# Patient Record
Sex: MALE | Race: BLACK OR AFRICAN AMERICAN | Employment: OTHER | URBAN - METROPOLITAN AREA
[De-identification: names, ages, dates, MRNs, and addresses within clinical notes are randomized per-mention and may not be internally consistent; named-entity substitution may affect disease eponyms.]

---

## 2018-12-31 ENCOUNTER — IP HISTORICAL/CONVERTED ENCOUNTER (OUTPATIENT)
Dept: OTHER | Age: 68
End: 2018-12-31

## 2020-02-28 ENCOUNTER — IP HISTORICAL/CONVERTED ENCOUNTER (OUTPATIENT)
Dept: OTHER | Age: 70
End: 2020-02-28

## 2020-05-22 ENCOUNTER — OP HISTORICAL/CONVERTED ENCOUNTER (OUTPATIENT)
Dept: OTHER | Age: 70
End: 2020-05-22

## 2020-08-28 ENCOUNTER — OP HISTORICAL/CONVERTED ENCOUNTER (OUTPATIENT)
Dept: OTHER | Age: 70
End: 2020-08-28

## 2020-08-29 ENCOUNTER — ED HISTORICAL/CONVERTED ENCOUNTER (OUTPATIENT)
Dept: OTHER | Age: 70
End: 2020-08-29

## 2021-01-06 ENCOUNTER — HOSPITAL ENCOUNTER (INPATIENT)
Age: 71
LOS: 7 days | Discharge: NURSING, REHAB OR LONG-TERM CARE | DRG: 207 | End: 2021-01-14
Attending: EMERGENCY MEDICINE | Admitting: ANESTHESIOLOGY
Payer: MEDICARE

## 2021-01-06 DIAGNOSIS — J95.851 VENTILATOR ASSOCIATED PNEUMONIA (HCC): Primary | ICD-10-CM

## 2021-01-06 LAB
ANION GAP SERPL CALC-SCNC: 4 MMOL/L (ref 5–15)
BUN SERPL-MCNC: 41 MG/DL (ref 6–20)
BUN/CREAT SERPL: 14 (ref 12–20)
CALCIUM SERPL-MCNC: 11.4 MG/DL (ref 8.5–10.1)
CHLORIDE SERPL-SCNC: 98 MMOL/L (ref 97–108)
CO2 SERPL-SCNC: 30 MMOL/L (ref 21–32)
CREAT SERPL-MCNC: 2.85 MG/DL (ref 0.7–1.3)
ERYTHROCYTE [DISTWIDTH] IN BLOOD BY AUTOMATED COUNT: 18 % (ref 11.5–14.5)
GLUCOSE SERPL-MCNC: 69 MG/DL (ref 65–100)
HCT VFR BLD AUTO: 25.9 % (ref 36.6–50.3)
HGB BLD-MCNC: 8.1 G/DL (ref 12.1–17)
MCH RBC QN AUTO: 28.1 PG (ref 26–34)
MCHC RBC AUTO-ENTMCNC: 31.3 G/DL (ref 30–36.5)
MCV RBC AUTO: 89.9 FL (ref 80–99)
NRBC # BLD: 0.5 K/UL (ref 0–0.01)
NRBC BLD-RTO: 3.4 PER 100 WBC
PLATELET # BLD AUTO: 260 K/UL (ref 150–400)
PMV BLD AUTO: 11 FL (ref 8.9–12.9)
POTASSIUM SERPL-SCNC: 4.1 MMOL/L (ref 3.5–5.1)
RBC # BLD AUTO: 2.88 M/UL (ref 4.1–5.7)
SODIUM SERPL-SCNC: 132 MMOL/L (ref 136–145)
WBC # BLD AUTO: 14.8 K/UL (ref 4.1–11.1)

## 2021-01-06 PROCEDURE — 83735 ASSAY OF MAGNESIUM: CPT

## 2021-01-06 PROCEDURE — 80048 BASIC METABOLIC PNL TOTAL CA: CPT

## 2021-01-06 PROCEDURE — 99285 EMERGENCY DEPT VISIT HI MDM: CPT

## 2021-01-06 PROCEDURE — 36415 COLL VENOUS BLD VENIPUNCTURE: CPT

## 2021-01-06 PROCEDURE — 94762 N-INVAS EAR/PLS OXIMTRY CONT: CPT

## 2021-01-06 PROCEDURE — 5A1955Z RESPIRATORY VENTILATION, GREATER THAN 96 CONSECUTIVE HOURS: ICD-10-PCS | Performed by: INTERNAL MEDICINE

## 2021-01-06 PROCEDURE — 84484 ASSAY OF TROPONIN QUANT: CPT

## 2021-01-06 PROCEDURE — 94002 VENT MGMT INPAT INIT DAY: CPT

## 2021-01-06 PROCEDURE — 84100 ASSAY OF PHOSPHORUS: CPT

## 2021-01-06 PROCEDURE — 3E033XZ INTRODUCTION OF VASOPRESSOR INTO PERIPHERAL VEIN, PERCUTANEOUS APPROACH: ICD-10-PCS | Performed by: INTERNAL MEDICINE

## 2021-01-06 PROCEDURE — 85027 COMPLETE CBC AUTOMATED: CPT

## 2021-01-07 ENCOUNTER — APPOINTMENT (OUTPATIENT)
Dept: NON INVASIVE DIAGNOSTICS | Age: 71
DRG: 207 | End: 2021-01-07
Attending: NURSE PRACTITIONER
Payer: MEDICARE

## 2021-01-07 ENCOUNTER — APPOINTMENT (OUTPATIENT)
Dept: GENERAL RADIOLOGY | Age: 71
DRG: 207 | End: 2021-01-07
Attending: EMERGENCY MEDICINE
Payer: MEDICARE

## 2021-01-07 PROBLEM — I95.9 HYPOTENSION: Status: ACTIVE | Noted: 2021-01-07

## 2021-01-07 LAB
B PERT DNA SPEC QL NAA+PROBE: NOT DETECTED
BORDETELLA PARAPERTUSSIS PCR, BORPAR: NOT DETECTED
C PNEUM DNA SPEC QL NAA+PROBE: NOT DETECTED
COVID-19 RAPID TEST, COVR: NOT DETECTED
FLUAV H1 2009 PAND RNA SPEC QL NAA+PROBE: NOT DETECTED
FLUAV H1 RNA SPEC QL NAA+PROBE: NOT DETECTED
FLUAV H3 RNA SPEC QL NAA+PROBE: NOT DETECTED
FLUAV SUBTYP SPEC NAA+PROBE: NOT DETECTED
FLUBV RNA SPEC QL NAA+PROBE: NOT DETECTED
HADV DNA SPEC QL NAA+PROBE: NOT DETECTED
HBV SURFACE AB SER QL: NONREACTIVE
HBV SURFACE AB SER-ACNC: <3.1 MIU/ML
HBV SURFACE AG SER QL: <0.1 INDEX
HBV SURFACE AG SER QL: NEGATIVE
HCOV 229E RNA SPEC QL NAA+PROBE: NOT DETECTED
HCOV HKU1 RNA SPEC QL NAA+PROBE: NOT DETECTED
HCOV NL63 RNA SPEC QL NAA+PROBE: NOT DETECTED
HCOV OC43 RNA SPEC QL NAA+PROBE: NOT DETECTED
HEALTH STATUS, XMCV2T: NORMAL
HEALTH STATUS, XMCV2T: NORMAL
HMPV RNA SPEC QL NAA+PROBE: NOT DETECTED
HPIV1 RNA SPEC QL NAA+PROBE: NOT DETECTED
HPIV2 RNA SPEC QL NAA+PROBE: NOT DETECTED
HPIV3 RNA SPEC QL NAA+PROBE: NOT DETECTED
HPIV4 RNA SPEC QL NAA+PROBE: NOT DETECTED
IRON SATN MFR SERPL: 7 % (ref 20–50)
IRON SERPL-MCNC: 17 UG/DL (ref 35–150)
LACTATE SERPL-SCNC: 0.8 MMOL/L (ref 0.4–2)
M PNEUMO DNA SPEC QL NAA+PROBE: NOT DETECTED
MAGNESIUM SERPL-MCNC: 2.6 MG/DL (ref 1.6–2.4)
PHOSPHATE SERPL-MCNC: 5.5 MG/DL (ref 2.6–4.7)
RSV RNA SPEC QL NAA+PROBE: NOT DETECTED
RV+EV RNA SPEC QL NAA+PROBE: NOT DETECTED
SARS-COV-2 PCR, COVPCR: NOT DETECTED
SARS-COV-2, COV2: NOT DETECTED
SOURCE, COVRS: NORMAL
SOURCE, COVRS: NORMAL
SPECIMEN SOURCE, FCOV2M: NORMAL
SPECIMEN SOURCE, FCOV2M: NORMAL
SPECIMEN TYPE, XMCV1T: NORMAL
SPECIMEN TYPE, XMCV1T: NORMAL
TIBC SERPL-MCNC: 235 UG/DL (ref 250–450)
TROPONIN I SERPL-MCNC: <0.05 NG/ML

## 2021-01-07 PROCEDURE — 87635 SARS-COV-2 COVID-19 AMP PRB: CPT

## 2021-01-07 PROCEDURE — 74011250637 HC RX REV CODE- 250/637: Performed by: INTERNAL MEDICINE

## 2021-01-07 PROCEDURE — 83605 ASSAY OF LACTIC ACID: CPT

## 2021-01-07 PROCEDURE — 74011250636 HC RX REV CODE- 250/636: Performed by: NURSE PRACTITIONER

## 2021-01-07 PROCEDURE — 74011000258 HC RX REV CODE- 258: Performed by: EMERGENCY MEDICINE

## 2021-01-07 PROCEDURE — P9047 ALBUMIN (HUMAN), 25%, 50ML: HCPCS | Performed by: NURSE PRACTITIONER

## 2021-01-07 PROCEDURE — 94664 DEMO&/EVAL PT USE INHALER: CPT

## 2021-01-07 PROCEDURE — 74011000250 HC RX REV CODE- 250: Performed by: ANESTHESIOLOGY

## 2021-01-07 PROCEDURE — 74011250636 HC RX REV CODE- 250/636: Performed by: EMERGENCY MEDICINE

## 2021-01-07 PROCEDURE — 71045 X-RAY EXAM CHEST 1 VIEW: CPT

## 2021-01-07 PROCEDURE — 90935 HEMODIALYSIS ONE EVALUATION: CPT

## 2021-01-07 PROCEDURE — 94760 N-INVAS EAR/PLS OXIMETRY 1: CPT

## 2021-01-07 PROCEDURE — 5A1D70Z PERFORMANCE OF URINARY FILTRATION, INTERMITTENT, LESS THAN 6 HOURS PER DAY: ICD-10-PCS | Performed by: INTERNAL MEDICINE

## 2021-01-07 PROCEDURE — 74011000258 HC RX REV CODE- 258: Performed by: NURSE PRACTITIONER

## 2021-01-07 PROCEDURE — 86706 HEP B SURFACE ANTIBODY: CPT

## 2021-01-07 PROCEDURE — 87040 BLOOD CULTURE FOR BACTERIA: CPT

## 2021-01-07 PROCEDURE — 87340 HEPATITIS B SURFACE AG IA: CPT

## 2021-01-07 PROCEDURE — 94003 VENT MGMT INPAT SUBQ DAY: CPT

## 2021-01-07 PROCEDURE — 87426 SARSCOV CORONAVIRUS AG IA: CPT

## 2021-01-07 PROCEDURE — 65610000006 HC RM INTENSIVE CARE

## 2021-01-07 PROCEDURE — 74011250637 HC RX REV CODE- 250/637: Performed by: NURSE PRACTITIONER

## 2021-01-07 PROCEDURE — 83540 ASSAY OF IRON: CPT

## 2021-01-07 PROCEDURE — 96374 THER/PROPH/DIAG INJ IV PUSH: CPT

## 2021-01-07 PROCEDURE — 36415 COLL VENOUS BLD VENIPUNCTURE: CPT

## 2021-01-07 PROCEDURE — 94640 AIRWAY INHALATION TREATMENT: CPT

## 2021-01-07 RX ORDER — AMMONIUM LACTATE 12 G/100G
LOTION TOPICAL 2 TIMES DAILY
COMMUNITY

## 2021-01-07 RX ORDER — SODIUM CHLORIDE 0.9 % (FLUSH) 0.9 %
5-40 SYRINGE (ML) INJECTION EVERY 8 HOURS
Status: DISCONTINUED | OUTPATIENT
Start: 2021-01-07 | End: 2021-01-14 | Stop reason: HOSPADM

## 2021-01-07 RX ORDER — DIPHENHYDRAMINE HCL 25 MG
25 CAPSULE ORAL ONCE
Status: COMPLETED | OUTPATIENT
Start: 2021-01-08 | End: 2021-01-08

## 2021-01-07 RX ORDER — ACETAMINOPHEN 325 MG/1
650 TABLET ORAL
Status: DISCONTINUED | OUTPATIENT
Start: 2021-01-07 | End: 2021-01-14 | Stop reason: HOSPADM

## 2021-01-07 RX ORDER — IPRATROPIUM BROMIDE AND ALBUTEROL SULFATE 2.5; .5 MG/3ML; MG/3ML
3 SOLUTION RESPIRATORY (INHALATION)
Status: DISCONTINUED | OUTPATIENT
Start: 2021-01-07 | End: 2021-01-07

## 2021-01-07 RX ORDER — NOREPINEPHRINE BITARTRATE/D5W 8 MG/250ML
.5-16 PLASTIC BAG, INJECTION (ML) INTRAVENOUS
Status: DISCONTINUED | OUTPATIENT
Start: 2021-01-08 | End: 2021-01-13

## 2021-01-07 RX ORDER — L. ACIDOPHILUS/L.BULGARICUS 100MM CELL
1 GRANULES IN PACKET (EA) ORAL 2 TIMES DAILY
COMMUNITY

## 2021-01-07 RX ORDER — LANOLIN ALCOHOL/MO/W.PET/CERES
3 CREAM (GRAM) TOPICAL
COMMUNITY

## 2021-01-07 RX ORDER — LANOLIN ALCOHOL/MO/W.PET/CERES
3 CREAM (GRAM) TOPICAL
Status: DISCONTINUED | OUTPATIENT
Start: 2021-01-07 | End: 2021-01-14 | Stop reason: HOSPADM

## 2021-01-07 RX ORDER — ALBUMIN HUMAN 50 G/1000ML
25 SOLUTION INTRAVENOUS ONCE
Status: COMPLETED | OUTPATIENT
Start: 2021-01-08 | End: 2021-01-08

## 2021-01-07 RX ORDER — POLYETHYLENE GLYCOL 3350 17 G/17G
17 POWDER, FOR SOLUTION ORAL DAILY PRN
Status: DISCONTINUED | OUTPATIENT
Start: 2021-01-07 | End: 2021-01-14 | Stop reason: HOSPADM

## 2021-01-07 RX ORDER — ALBUTEROL SULFATE 0.83 MG/ML
2.5 SOLUTION RESPIRATORY (INHALATION)
COMMUNITY

## 2021-01-07 RX ORDER — FAMOTIDINE 20 MG/1
20 TABLET, FILM COATED ORAL DAILY
Status: DISCONTINUED | OUTPATIENT
Start: 2021-01-07 | End: 2021-01-14 | Stop reason: HOSPADM

## 2021-01-07 RX ORDER — MIDODRINE HYDROCHLORIDE 5 MG/1
10 TABLET ORAL
Status: DISCONTINUED | OUTPATIENT
Start: 2021-01-07 | End: 2021-01-09

## 2021-01-07 RX ORDER — ALBUMIN HUMAN 250 G/1000ML
12.5 SOLUTION INTRAVENOUS
Status: DISCONTINUED | OUTPATIENT
Start: 2021-01-07 | End: 2021-01-14 | Stop reason: HOSPADM

## 2021-01-07 RX ORDER — ONDANSETRON 2 MG/ML
4 INJECTION INTRAMUSCULAR; INTRAVENOUS
COMMUNITY

## 2021-01-07 RX ORDER — GUAIFENESIN 100 MG/5ML
200 SOLUTION ORAL EVERY 6 HOURS
COMMUNITY

## 2021-01-07 RX ORDER — IPRATROPIUM BROMIDE AND ALBUTEROL SULFATE 2.5; .5 MG/3ML; MG/3ML
3 SOLUTION RESPIRATORY (INHALATION)
Status: DISCONTINUED | OUTPATIENT
Start: 2021-01-07 | End: 2021-01-14 | Stop reason: HOSPADM

## 2021-01-07 RX ORDER — MIDODRINE HYDROCHLORIDE 5 MG/1
15 TABLET ORAL EVERY 8 HOURS
COMMUNITY

## 2021-01-07 RX ORDER — ONDANSETRON 2 MG/ML
4 INJECTION INTRAMUSCULAR; INTRAVENOUS
Status: DISCONTINUED | OUTPATIENT
Start: 2021-01-07 | End: 2021-01-14 | Stop reason: HOSPADM

## 2021-01-07 RX ORDER — CHLORHEXIDINE GLUCONATE 1.2 MG/ML
15 RINSE ORAL
COMMUNITY

## 2021-01-07 RX ORDER — LEVOTHYROXINE SODIUM 100 UG/1
100 TABLET ORAL DAILY
COMMUNITY

## 2021-01-07 RX ORDER — SODIUM CHLORIDE 0.9 % (FLUSH) 0.9 %
5-40 SYRINGE (ML) INJECTION AS NEEDED
Status: DISCONTINUED | OUTPATIENT
Start: 2021-01-07 | End: 2021-01-14 | Stop reason: HOSPADM

## 2021-01-07 RX ORDER — IPRATROPIUM BROMIDE AND ALBUTEROL SULFATE 2.5; .5 MG/3ML; MG/3ML
3 SOLUTION RESPIRATORY (INHALATION)
COMMUNITY

## 2021-01-07 RX ORDER — HEPARIN SODIUM 5000 [USP'U]/ML
5000 INJECTION, SOLUTION INTRAVENOUS; SUBCUTANEOUS EVERY 12 HOURS
COMMUNITY

## 2021-01-07 RX ORDER — ACETAMINOPHEN 650 MG/1
650 SUPPOSITORY RECTAL
Status: DISCONTINUED | OUTPATIENT
Start: 2021-01-07 | End: 2021-01-14 | Stop reason: HOSPADM

## 2021-01-07 RX ORDER — ALBUMIN HUMAN 250 G/1000ML
25 SOLUTION INTRAVENOUS ONCE
Status: COMPLETED | OUTPATIENT
Start: 2021-01-07 | End: 2021-01-08

## 2021-01-07 RX ORDER — FAMOTIDINE 20 MG/1
20 TABLET, FILM COATED ORAL DAILY
COMMUNITY

## 2021-01-07 RX ORDER — PROMETHAZINE HYDROCHLORIDE 25 MG/1
12.5 TABLET ORAL
Status: DISCONTINUED | OUTPATIENT
Start: 2021-01-07 | End: 2021-01-14 | Stop reason: HOSPADM

## 2021-01-07 RX ORDER — CHLORHEXIDINE GLUCONATE 0.12 MG/ML
15 RINSE ORAL EVERY 12 HOURS
Status: DISCONTINUED | OUTPATIENT
Start: 2021-01-07 | End: 2021-01-14 | Stop reason: HOSPADM

## 2021-01-07 RX ORDER — ACETAMINOPHEN 160 MG/5ML
650 SOLUTION ORAL
COMMUNITY

## 2021-01-07 RX ORDER — VANCOMYCIN 2 GRAM/500 ML IN 0.9 % SODIUM CHLORIDE INTRAVENOUS
2 ONCE
Status: COMPLETED | OUTPATIENT
Start: 2021-01-07 | End: 2021-01-07

## 2021-01-07 RX ADMIN — PIPERACILLIN AND TAZOBACTAM 3.38 G: 3; .375 INJECTION, POWDER, LYOPHILIZED, FOR SOLUTION INTRAVENOUS at 13:04

## 2021-01-07 RX ADMIN — ACETAMINOPHEN 650 MG: 325 TABLET ORAL at 21:09

## 2021-01-07 RX ADMIN — IPRATROPIUM BROMIDE AND ALBUTEROL SULFATE 3 ML: .5; 3 SOLUTION RESPIRATORY (INHALATION) at 19:55

## 2021-01-07 RX ADMIN — ALBUMIN (HUMAN) 25 G: 0.25 INJECTION, SOLUTION INTRAVENOUS at 22:34

## 2021-01-07 RX ADMIN — VANCOMYCIN HYDROCHLORIDE 750 MG: 750 INJECTION, POWDER, LYOPHILIZED, FOR SOLUTION INTRAVENOUS at 20:13

## 2021-01-07 RX ADMIN — PIPERACILLIN AND TAZOBACTAM 3.38 G: 3; .375 INJECTION, POWDER, LYOPHILIZED, FOR SOLUTION INTRAVENOUS at 01:02

## 2021-01-07 RX ADMIN — VANCOMYCIN HYDROCHLORIDE 2000 MG: 10 INJECTION, POWDER, LYOPHILIZED, FOR SOLUTION INTRAVENOUS at 06:08

## 2021-01-07 RX ADMIN — CHLORHEXIDINE GLUCONATE 15 ML: 0.12 RINSE ORAL at 08:32

## 2021-01-07 RX ADMIN — CHLORHEXIDINE GLUCONATE 15 ML: 0.12 RINSE ORAL at 21:00

## 2021-01-07 RX ADMIN — FAMOTIDINE 20 MG: 20 TABLET, FILM COATED ORAL at 08:32

## 2021-01-07 RX ADMIN — Medication 10 ML: at 21:10

## 2021-01-07 RX ADMIN — Medication 10 ML: at 01:57

## 2021-01-07 RX ADMIN — MIDODRINE HYDROCHLORIDE 10 MG: 5 TABLET ORAL at 20:13

## 2021-01-07 NOTE — CONSULTS
[address]    Name:  Fauzia Fylnn  MR#:  [id number]  :  1950  ACCOUNT #:  [de-identified]  DATE OF SERVICE:  2021      REFERRING PHYSICIAN:  Dr. Mary Reece from emergency room. REASON FOR CONSULTATION:  End-stage renal disease, for provision of dialysis. HISTORY OF PRESENT ILLNESS:  The patient is a 79-year-old black man who has end-stage renal disease. He was seen and managed by the Nephrology team at [facility].  Originally, he came from [facility]. The patient has chronic respiratory failure. He is on vent. He has PEG tube placement. He is on Monday, Wednesday, and Friday schedule for dialysis. The patient's dialysis treatment was complicated with intradialytic hypotension which prevented achieving adequate ultrafiltration. At some point, the primary team decided to send the patient out because of a newly-developed pulmonary edema and hypervolemia with peripheral edema as well. The patient was dialyzed yesterday with total ultrafiltration of only 500 mL. The patient is not able to provide any meaningful history. From what I remember, he has a history of diabetes, end-stage renal disease, respiratory failure, hypoxic anemia, dementia. PAST SURGICAL HISTORY:  Trach and PEG placement. MEDICATIONS:  Medication list is not available currently. ALLERGIES:  NO KNOWN MEDICAL ALLERGIES. SOCIAL HISTORY:  Not obtainable. FAMILY HISTORY:  Not obtainable. REVIEW OF SYSTEMS:  Not obtainable. PHYSICAL EXAMINATION:  GENERAL:  An elderly black male, who looks older than his stated age. VITAL SIGNS:  He is on vent. Blood pressure is 128/96, heart rate is 88, temperature is 97.2. HEENT:  Head is normocephalic. The patient is awake. He tracks with eyes. NECK:  With tracheostomy, on vent. LUNGS:  With coarse breathing with bibasilar rales. HEART:  S1 and S2 with regular rate and rhythm. ABDOMEN:  Soft, nontender, and nondistended.   EXTREMITIES: With 2+ edema. SKIN:  With normal turgor. NEUROLOGIC:  The patient is awake and alert. He is able to communicate, trying to mouth words. LABORATORY DATA:  Sodium is 132, potassium is 4.1, CO2 is 30, BUN is 41, creatinine is 2.85. Hemoglobin is 8.1, white blood count is 14.8. DIAGNOSTIC DATA:  Chest x-ray shows diffuse pulmonary edema. IMPRESSION:  1. End-stage renal disease, on hemodialysis. The patient has hypervolemia with peripheral edema and central congestion. The ultrafiltration so far has been successful due to intradialytic hypotension. 2.  Electrolytes are with mild hyponatremia. 3.  Anemia of end-stage renal disease. 4.  Respiratory failure, on vent. 5.  General debility. RECOMMENDATIONS:  1. The patient's blood pressure seems to be stable. We will attempt first intermittent hemodialysis using albumin blood pressure support and linear sodium modeling with dialysate temperature. If the patient tolerates that, we will alternate dialysis and ultrafiltration to resolve hypervolemia. In case, the patient is not able to tolerate dialysis and he becomes profoundly hypotensive, he may require a short course of continuous veno-venous hemofiltration. 2.  Continue using midodrine for blood pressure support. 3.  Anemia management, obtain iron profile and start on Epogen. Thank you very much for the opportunity to be part of this patient's care.       Jose Manuel Varela MD      [id number]  D:  01/07/2021 10:43  T:  01/07/2021 11:29  JOB #:  [id number]  CC:  Bertram Slade MD

## 2021-01-07 NOTE — PROGRESS NOTES
Day #1 of Vancomycin  Indication:  VAP  -trach, from [place]  -CXR with pulm edema and airspace infiltrate  Current regimen:  vanc 750mg post-HD  Abx regimen:  vanc/pip-tazo  ID Following ?: NO  Inpatient dialysis schedule:  once - today    Recent Labs     21  2305   WBC 14.8*   CREA 2.85*   BUN 41*     Est CrCl: ESRD on HD  Temp (24hrs), Av.3 °F (36.3 °C), Min:97 °F (36.1 °C), Max:98 °F (36.7 °C)    Cultures:    blood: in process   respiratory viral panel (-)ve    Goal trough = 20 - 25 mcg/mL for therapeutic goal of 15 - 20 mcg/mL (assuming ~35% removal by dialysis)    Date                Dialysis (Yes/No)       Pre-HD Level              Dose          Y                                   --                 vanc 750mg    Reminder staff message entered (if needed): YES    Plan: Continue current regimen. Will follow renal plan for HD and dosing schedule. Ordered for vanc 750 mg x1 after HD today.      Vancomycin Process in Hemodialysis    Bailee Cristina

## 2021-01-07 NOTE — H&P
History and Physical    Subjective:     Loren Mahan is a 72 y.o. male with a past medical history of chronic respiratory failure s/p trach/PEG and ESRD on MWF dialysis who presented from [facility] for hypotension during dialysis. On arrival, patient hemodynamically stable, normotensive, and on baseline ventilator settings. Patient unable to provide history and no documentation provided from [facility]. CXR in ED consistent with pulmonary edema and R lung infiltrate. Patient treated to VAP. ICU called for admission given chronic ventilator requirement. No past medical history on file. No past surgical history on file. No family history on file. Social History     Tobacco Use    Smoking status: Not on file   Substance Use Topics    Alcohol use: Not on file       Prior to Admission medications    Not on File     No Known Allergies     Review of Systems:  Review of systems not obtained due to patient factors. Objective: Intake and Output:    No intake/output data recorded. No intake/output data recorded. Physical Exam:   Visit Vitals  /73   Pulse 70   Temp 97.1 °F (36.2 °C)   Resp 17   Ht 6' (1.829 m)   Wt 99.8 kg (220 lb)   SpO2 94%   BMI 29.84 kg/m²     General:  Alert, cooperative, no distress, appears stated age. Head:  Normocephalic, without obvious abnormality, atraumatic. Eyes:  Conjunctivae/corneas clear. PERRL, EOMs intact. Throat: Lips, mucosa, and tongue normal. Teeth and gums normal.   Neck: Supple, symmetrical, trachea midline, no adenopathy. Tracheostomy midline. Lungs:   Coarse to auscultation bilaterally. Heart:  Regular rate and rhythm, S1, S2 normal, no murmur, click, rub or gallop. Abdomen:   Soft, non-tender. Bowel sounds normal. No masses,  No organomegaly. Extremities: Extremities normal, atraumatic, no cyanosis or edema. Pulses: 2+ and symmetric all extremities.    Skin: Skin color, texture, turgor normal. No rashes or lesions   Lymph nodes: Cervical, supraclavicular, and axillary nodes normal.   Neurologic: Alert, mouthing words, moves all extremities spontaneously       ECG:  normal EKG, normal sinus rhythm     Data Review:   Recent Results (from the past 24 hour(s))   METABOLIC PANEL, BASIC    Collection Time: 01/06/21 11:05 PM   Result Value Ref Range    Sodium 132 (L) 136 - 145 mmol/L    Potassium 4.1 3.5 - 5.1 mmol/L    Chloride 98 97 - 108 mmol/L    CO2 30 21 - 32 mmol/L    Anion gap 4 (L) 5 - 15 mmol/L    Glucose 69 65 - 100 mg/dL    BUN 41 (H) 6 - 20 MG/DL    Creatinine 2.85 (H) 0.70 - 1.30 MG/DL    BUN/Creatinine ratio 14 12 - 20      GFR est AA 27 (L) >60 ml/min/1.73m2    GFR est non-AA 22 (L) >60 ml/min/1.73m2    Calcium 11.4 (H) 8.5 - 10.1 MG/DL   CBC W/O DIFF    Collection Time: 01/06/21 11:05 PM   Result Value Ref Range    WBC 14.8 (H) 4.1 - 11.1 K/uL    RBC 2.88 (L) 4.10 - 5.70 M/uL    HGB 8.1 (L) 12.1 - 17.0 g/dL    HCT 25.9 (L) 36.6 - 50.3 %    MCV 89.9 80.0 - 99.0 FL    MCH 28.1 26.0 - 34.0 PG    MCHC 31.3 30.0 - 36.5 g/dL    RDW 18.0 (H) 11.5 - 14.5 %    PLATELET 599 599 - 771 K/uL    MPV 11.0 8.9 - 12.9 FL    NRBC 3.4 (H) 0  WBC    ABSOLUTE NRBC 0.50 (H) 0.00 - 0.01 K/uL   SARS-COV-2    Collection Time: 01/07/21  1:44 AM   Result Value Ref Range    Specimen source Nasopharyngeal      Specimen source Nasopharyngeal      COVID-19 rapid test Not detected NOTD      Specimen type NP Swab      Health status Symptomatic Testing         Chest x-ray Pulmonary edema with airspace infiltrate in the right lung. Tubes  and lines as above.     Assessment:   - Hypotension during dialysis- currently normotensive  - ESRD on iHD MWF- no acute need for iHD  - Chronic hypoxic respiratory failure s/p trach (on baseline vent settings)  - Concern for VAP given RUL infiltrate (patient only with leukocytosis and CXR findings, no other signs of sepsis)- COVID ruled out     Plan:   - Nephrology consulted- plan for iHD today  - May need albumin or midodrine to complete dialysis  - TTE ordered to evaluate cardiac function  - Continue vancomycin and zosyn  - Blood and sputum cultures pending. COVID negative. Patient anuric  - Check lactate, add on mag, phos, and troponin  - Wean ventilator as tolerated to maintain SpO2 > 92%  - Obtain records from [facility]- called and asked them to fax over information.     CRISTINA Sanderson  45 mins

## 2021-01-07 NOTE — PROGRESS NOTES
Patient is in stretcher. Patient is alert. VSS. No distress noted. Warm blanket to patient. Stretcher is locked and is in lowest position. Call bell within reach.

## 2021-01-07 NOTE — PROCEDURES
[facility]  [phone]    Vitals   Pre   Post   Assessment   Pre   Post     Temp  Temp: 98 °F (36.7 °C) (01/07/21 1400)  97.8 LOC  Alert and non-verbal. Patient respond with signs  Alert and non responsive verbally but can communicate through writing and signs    HR   Pulse (Heart Rate): (!) 58 (01/07/21 1400) 82 Lungs   crackles  crackles   B/P   BP: 104/70 (01/07/21 1400) 103/72 Cardiac   Irregular rhythm   Irregular rhythm    Resp   Resp Rate: 25 (01/07/21 1400) 28 Skin   Warm and dry   warm and dry    Pain level  Pain Intensity 1: 0 (01/07/21 0800) 0 Edema  +1pitting edema at the lower extremities and generalized edema   +1 PITTING EDEMA to the lower extremities and generalized edema   Orders:  Reviewed   Duration:   Start:    1400 End:    1715 Total:   3.5hrs    Dialyzer:   Dialyzer/Set Up Inspection: Revaclear (01/07/21 1400)   K Bath:   Dialysate K (mEq/L): 2.5 (01/07/21 1400)   Ca Bath:   Dialysate CA (mEq/L): 2.5 (01/07/21 1400)   Na/Bicarb:   Dialysate NA (mEq/L): 138 (01/07/21 1400)   Target Fluid Removal:   Goal/Amount of Fluid to Remove (mL): 2500 mL (01/07/21 1400)   Access  Right CVC. Clean and dressing intact. No s/s of infection around the site. Each catheter limb disinfected per p&p, caps removed, hubs disinfected per p&p. Aspirated and flushed with ease.     Type & Location:   Right  Subclavian CVC    Labs  Reviewed    Obtained/Reviewed   Critical Results Called   Date when labs were drawn-  Hgb-    HGB   Date Value Ref Range Status   01/06/2021 8.1 (L) 12.1 - 17.0 g/dL Final     K-    Potassium   Date Value Ref Range Status   01/06/2021 4.1 3.5 - 5.1 mmol/L Final     Ca-   Calcium   Date Value Ref Range Status   01/06/2021 11.4 (H) 8.5 - 10.1 MG/DL Final     Bun-   BUN   Date Value Ref Range Status   01/06/2021 41 (H) 6 - 20 MG/DL Final     Creat-   Creatinine   Date Value Ref Range Status   01/06/2021 2.85 (H) 0.70 - 1.30 MG/DL Final        Medications/ Blood Products Given     Name   Dose   Route and Time     NONE NONE  NONE             Blood Volume Processed (BVP):    68.2 Net Fluid   Removed:  2500ml   Comments   Time Out Done: 1358  Primary Nurse Rpt Pre: Cierra Nava RN  Primary Nurse Rpt Post: Cierra Nava RN  Pt Education:HD education   Care Plan:Continue with HD care plan   Tx Summary:  1400: Treatment initiated with 200ml nss given with no issues   1430: Patient tolerating treatment no complain voiced. Nephrologist aware of patient bp during treatment.   1730: Patient tolerated treatment well. All possible blood returned with no issues. Each dialysis catheter limb disinfected per p&p, blood returned per p&p, each dialysis hub disinfected per p&p, post dialysis catheter dwell instilled per order, and caps applied.SBAR given to primary nurse Cierra Nava RN. Vitals stable upon departure.  Admiting Diagnosis: Hypotension   Pt's previous clinic- [facility]   Consent signed - Informed Consent Verified: Yes (01/07/21 1400)  Hepatitis Status- Negative 1/07/2021, Susceptible 01/07/2021  Machine #- Machine Number: [id number] (01/07/21 1400)  Telemetry status-Monitored remotely

## 2021-01-07 NOTE — ED PROVIDER NOTES
76-year-old gentleman with end-stage renal disease on dialysis presents after an episode of hypotension while undergoing dialysis. Per EMS, patient's systolic blood pressure went down to 80s while he was undergoing dialysis. Client trach on ventilatior. NAD. The history is provided by the EMS personnel. History limited by: Patient nonverbal.   Hypotension          No past medical history on file. No past surgical history on file. No family history on file. Social History     Socioeconomic History    Marital status:      Spouse name: Not on file    Number of children: Not on file    Years of education: Not on file    Highest education level: Not on file   Occupational History    Not on file   Social Needs    Financial resource strain: Not on file    Food insecurity     Worry: Not on file     Inability: Not on file    Transportation needs     Medical: Not on file     Non-medical: Not on file   Tobacco Use    Smoking status: Not on file   Substance and Sexual Activity    Alcohol use: Not on file    Drug use: Not on file    Sexual activity: Not on file   Lifestyle    Physical activity     Days per week: Not on file     Minutes per session: Not on file    Stress: Not on file   Relationships    Social connections     Talks on phone: Not on file     Gets together: Not on file     Attends Congregational service: Not on file     Active member of club or organization: Not on file     Attends meetings of clubs or organizations: Not on file     Relationship status: Not on file    Intimate partner violence     Fear of current or ex partner: Not on file     Emotionally abused: Not on file     Physically abused: Not on file     Forced sexual activity: Not on file   Other Topics Concern    Not on file   Social History Narrative    Not on file         ALLERGIES: Patient has no known allergies.     Review of Systems   Unable to perform ROS: Patient nonverbal       Vitals:    01/06/21 2025 01/06/21 2036 01/06/21 2040 01/06/21 2043   BP:  106/72     Pulse: 81 63     Resp: 26 12     Temp:    97.1 °F (36.2 °C)   SpO2: 94% 98% 96%    Weight:  99.8 kg (220 lb)     Height:  6' (1.829 m)              Physical Exam  Vitals signs and nursing note reviewed. Constitutional:       General: He is not in acute distress. Comments: Cortney Harvinder in place and patient on vent     HENT:      Head: Normocephalic and atraumatic. Mouth/Throat:      Mouth: Mucous membranes are moist.   Cardiovascular:      Rate and Rhythm: Normal rate and regular rhythm. Pulses: Normal pulses. Pulmonary:      Effort: Pulmonary effort is normal.      Breath sounds: Rhonchi present. Abdominal:      General: Abdomen is flat. There is no distension. Skin:     General: Skin is warm and dry. Neurological:      Mental Status: He is alert. MDM  Number of Diagnoses or Management Options         Procedures      71-year-old gentleman sent from his long-term care hospital for persistent hypotension after dialysis. On arrival patient had normal vital signs and was normotensive, and he remained stable throughout his emergency department stay. I discussed his presentation with the physician at [facility] who reported that he has been unable to complete his hemodialysis because of his transient episodes of hypotension and they were concerned that the reason they have been unable to wean him off his ventilator is because of fluid retention. 12:26 AM  Patient's presentation results were discussed with Dr. Son Akers, nephrology, who will have one of his partners see the patient at [facility] tomorrow to adjust his settings for hemodialysis. He recommends adjustment of his hemodialysis before admitting to the hospital for CVVH.

## 2021-01-07 NOTE — PROGRESS NOTES
Patient comfortable in stretcher. TV on in room. Patient wrote down his name. Non-verbal.  Patient alert. VSS. No noted distress. Fixed sheet over patient. Patient shook his head \"no\" when asked if he needed anything.

## 2021-01-07 NOTE — ED NOTES
RT to bedside. Client given pen and paper for communications. NAD. Vitals stable. Monitoring incosistent.

## 2021-01-07 NOTE — PROGRESS NOTES
Melly (dialysis) is bedside giving patient dialysis. Cycle will last over 3 hours. Patient stable. No distress at this time. Stretcher is in lowest position and locked. Call bell within reach.

## 2021-01-07 NOTE — ED TRIAGE NOTES
Client sent from [facility] for hypotension. Client ESR. During post dialysis client reported to have continued systolic bp in 08'D. Client trach on ventilatior. NAD.

## 2021-01-07 NOTE — ED NOTES
Spoke with clients spouse. Informed of admission plan, and reaffirmed client would be placed in ICU room as soon as possible. Client NAD.

## 2021-01-07 NOTE — CONSULTS
Patient is known to me from a recent admission and hospitalization at [facility]. He is an ESRD patient. HD treatments were complicated with hypotension and inability to UF fluid. The primary team decided to sent patient out for ? CRT  Patient came from [facility], but the hospital at [facility] has been on diversion.   Currently patient's BP seems to b stable  Will try iHD before considering CRT

## 2021-01-07 NOTE — PROGRESS NOTES
Day #1 of Vancomycin  Indication:  VAP  -trach, from [place]  -CXR with pulm edema and airspace infiltrate  Current regimen:  vanc 2g x1  Abx regimen:  vanc/pip-tazo  ID Following ?: NO  Inpatient dialysis schedule:  not yet seen by nephrology, presumed outpt schedule M/W/F    Recent Labs     21  2305   WBC 14.8*   CREA 2.85*   BUN 41*     Est CrCl: ESRD on HD  Temp (24hrs), Av.2 °F (36.2 °C), Min:97.1 °F (36.2 °C), Max:97.2 °F (36.2 °C)    Cultures:    blood: in process    Goal trough = 20 - 25 mcg/mL for therapeutic goal of 15 - 20 mcg/mL (assuming ~35% removal by dialysis)    Date                Dialysis (Yes/No)       Pre-HD Level              Dose          Anticipated today         --                 vanc 2g x1    Reminder staff message entered (if needed): YES    Plan: Change to vanc 750 mg post HD. Will follow renal plan for HD and dosing schedule. Anticipate HD today given patient unable to complete session yesterday.      Vancomycin Process in Hemodialysis    Bailee Neumann

## 2021-01-07 NOTE — PROGRESS NOTES
0730 Bedside shift change report given to NINFA Land (oncoming nurse) by Shahla Catherine (offgoing nurse). Report included the following information SBAR, Kardex, Intake/Output, MAR and Cardiac Rhythm SB.   0845 Patient with 2 large watery BM. Attempted to swab for Covid, patient refused, pulled it from RN hand and threw across room. Educated patient on importance of swab, patient still refusing. 330 [facility] to patients wife, patient agreeable to covid swab. Sample sent to lab  1200 Bedside and Verbal shift change report given to [address] (oncoming nurse) by Noreen Nicole (offgoing nurse). Report included the following information SBAR, Kardex, Intake/Output and MAR.

## 2021-01-07 NOTE — ROUTINE PROCESS
TRANSFER - OUT REPORT:    Verbal report given to Sarah OCASIO on Loren Mahan  being transferred to [facility] for routine progression of care       Report consisted of patients Situation, Background, Assessment and   Recommendations(SBAR). Information from the following report(s) SBAR, ED Summary and Recent Results was reviewed with the receiving nurse. Lines:   PICC Double Lumen 01/07/21 Left; Other(comment) (Active)   Central Line Being Utilized Yes 01/07/21 0800   Criteria for Appropriate Use Hemodynamically unstable, requiring monitoring lines, vasopressors, or volume resuscitation 01/07/21 0800   Site Assessment Clean, dry, & intact 01/07/21 0800   Phlebitis Assessment 0 01/07/21 0800   Infiltration Assessment 0 01/07/21 0800   Date of Last Dressing Change 01/07/21 01/07/21 0800   Dressing Status Clean, dry, & intact 01/07/21 0800   Action Taken Open ports on tubing capped 01/07/21 0800   Dressing Type Disk with Chlorhexadine gluconate (CHG); Transparent 01/07/21 0800   Hub Color/Line Status Red 01/07/21 0800   Hub Color/Line Status Purple 01/07/21 0800   Alcohol Cap Used Yes 01/07/21 0800        Opportunity for questions and clarification was provided.       Patient transported with:   Registered Nurse  Tech

## 2021-01-07 NOTE — PROGRESS NOTES
Pharmacist Admission Medication Reconciliation:    Medication history was obtained from the pharmacist [place] at [facility]. All medications were newly added to the PTA medication list.   Rx Query data available? ¹ NO  Reviewed active and held orders. YES    Notes:   - started on Vancomycin + Zosyn for sepsis at [address] [place] [address]. ID was consulted.  - Sputum cultures resulted with pseudomonas, and c difficile came back positive, so vancomycin IV was stopped and PO was started.  - Worsened BPs on  so started Levophed, and changed Zosyn to Merrem plus Cipro. Levophed was weaned 1. Merrem/Cipro stopped 1/3. Continued on PO vancomycin until admitted (10 days). Thank you for allowing me to participate in this patient's care. Please call [phone] or [phone] with any questions. Darwin Garcia, Pharmacist          [facility] data reflects medications filled and processed through the patient's insurance,   however this data does NOT capture whether the medication was picked up or is currently being taken by the patient. Prior to Admission Medications:   Prior to Admission Medications   Prescriptions Last Dose Informant Taking?   acetaminophen (TYLENOL) 650 mg/20.3 mL solution   Yes   Sig: Take 650 mg by mouth every six (6) hours as needed for Fever (pain). albuterol (PROVENTIL VENTOLIN) 2.5 mg /3 mL (0.083 %) nebu   Yes   Si.5 mg by Nebulization route three (3) times daily as needed for Wheezing, Shortness of Breath or Respiratory Distress. albuterol-ipratropium (DUO-NEB) 2.5 mg-0.5 mg/3 ml nebu   Yes   Sig: 3 mL by Nebulization route three (3) times daily as needed for Wheezing, Shortness of Breath or Respiratory Distress. ammonium lactate (LAC-HYDRIN) 12 % lotion   Yes   Sig: Apply  to affected area two (2) times a day.  rub in to affected area well   chlorhexidine (PERIDEX) 0.12 % solution   Yes   Sig: 15 mL by Swish and Spit route three (3) times daily as needed (chronic ventilation - mouth swab). famotidine (PEPCID) 20 mg tablet   Yes   Sig: Take 20 mg by mouth daily. guaiFENesin (ROBITUSSIN) 100 mg/5 mL liquid   Yes   Sig: Take 200 mg by mouth every six (6) hours. heparin sodium,porcine (heparin, porcine,) 5,000 unit/mL injection   Yes   Si,000 Units by SubCUTAneous route every twelve (12) hours. insulin regular (NOVOLIN R, HUMULIN R) 100 unit/mL injection   Yes   Sig: by SubCUTAneous route. Sliding scale at [facility]   lactobacillus-acidophilus (LACTINEX) 100 million cell grpk   Yes   Sig: Take 1 Packet by mouth two (2) times a day. (Floranex)   levothyroxine (Synthroid) 100 mcg tablet   Yes   Sig: Take 100 mcg by mouth daily. melatonin 3 mg tablet   Yes   Sig: Take 3 mg by mouth nightly. midodrine (PROAMATINE) 5 mg tablet   Yes   Sig: Take 15 mg by mouth every eight (8) hours. ondansetron (ZOFRAN) 2 mg/mL injection   Yes   Si mg by IntraVENous route every four (4) hours as needed for Nausea. vancomycin HCl (VANCOMYCIN PO) 2021 at 1400  Yes   Sig: Take 500 mg by mouth every six (6) hours.  Indications: diarrhea from an infection with Clostridium difficile bacteria, C diff positive 20 at [address] [place] [address]      Facility-Administered Medications: None

## 2021-01-08 ENCOUNTER — APPOINTMENT (OUTPATIENT)
Dept: NON INVASIVE DIAGNOSTICS | Age: 71
DRG: 207 | End: 2021-01-08
Attending: NURSE PRACTITIONER
Payer: MEDICARE

## 2021-01-08 ENCOUNTER — APPOINTMENT (OUTPATIENT)
Dept: GENERAL RADIOLOGY | Age: 71
DRG: 207 | End: 2021-01-08
Attending: EMERGENCY MEDICINE
Payer: MEDICARE

## 2021-01-08 ENCOUNTER — APPOINTMENT (OUTPATIENT)
Dept: VASCULAR SURGERY | Age: 71
DRG: 207 | End: 2021-01-08
Attending: EMERGENCY MEDICINE
Payer: MEDICARE

## 2021-01-08 LAB
ANION GAP SERPL CALC-SCNC: 11 MMOL/L (ref 5–15)
BUN SERPL-MCNC: 26 MG/DL (ref 6–20)
BUN/CREAT SERPL: 12 (ref 12–20)
CALCIUM SERPL-MCNC: 9.7 MG/DL (ref 8.5–10.1)
CHLORIDE SERPL-SCNC: 104 MMOL/L (ref 97–108)
CO2 SERPL-SCNC: 24 MMOL/L (ref 21–32)
CREAT SERPL-MCNC: 2.18 MG/DL (ref 0.7–1.3)
ERYTHROCYTE [DISTWIDTH] IN BLOOD BY AUTOMATED COUNT: 17.4 % (ref 11.5–14.5)
GLUCOSE BLD STRIP.AUTO-MCNC: 75 MG/DL (ref 65–100)
GLUCOSE SERPL-MCNC: 66 MG/DL (ref 65–100)
HCT VFR BLD AUTO: 23.5 % (ref 36.6–50.3)
HGB BLD-MCNC: 7.5 G/DL (ref 12.1–17)
MCH RBC QN AUTO: 28.2 PG (ref 26–34)
MCHC RBC AUTO-ENTMCNC: 31.9 G/DL (ref 30–36.5)
MCV RBC AUTO: 88.3 FL (ref 80–99)
NRBC # BLD: 0.32 K/UL (ref 0–0.01)
NRBC BLD-RTO: 2.3 PER 100 WBC
PLATELET # BLD AUTO: 248 K/UL (ref 150–400)
PMV BLD AUTO: 11.1 FL (ref 8.9–12.9)
POTASSIUM SERPL-SCNC: 4 MMOL/L (ref 3.5–5.1)
RBC # BLD AUTO: 2.66 M/UL (ref 4.1–5.7)
SERVICE CMNT-IMP: NORMAL
SODIUM SERPL-SCNC: 139 MMOL/L (ref 136–145)
WBC # BLD AUTO: 13.7 K/UL (ref 4.1–11.1)

## 2021-01-08 PROCEDURE — 74011250637 HC RX REV CODE- 250/637: Performed by: NURSE PRACTITIONER

## 2021-01-08 PROCEDURE — 74011000258 HC RX REV CODE- 258: Performed by: NURSE PRACTITIONER

## 2021-01-08 PROCEDURE — 74011250636 HC RX REV CODE- 250/636: Performed by: INTERNAL MEDICINE

## 2021-01-08 PROCEDURE — 77030037877 HC DRSG MEPILEX >48IN BORD MOLN -A

## 2021-01-08 PROCEDURE — 36415 COLL VENOUS BLD VENIPUNCTURE: CPT

## 2021-01-08 PROCEDURE — 74011000250 HC RX REV CODE- 250: Performed by: ANESTHESIOLOGY

## 2021-01-08 PROCEDURE — 74011000250 HC RX REV CODE- 250: Performed by: NURSE PRACTITIONER

## 2021-01-08 PROCEDURE — 94640 AIRWAY INHALATION TREATMENT: CPT

## 2021-01-08 PROCEDURE — 94761 N-INVAS EAR/PLS OXIMETRY MLT: CPT

## 2021-01-08 PROCEDURE — 36593 DECLOT VASCULAR DEVICE: CPT

## 2021-01-08 PROCEDURE — 65620000000 HC RM CCU GENERAL

## 2021-01-08 PROCEDURE — 87205 SMEAR GRAM STAIN: CPT

## 2021-01-08 PROCEDURE — 74011250637 HC RX REV CODE- 250/637: Performed by: INTERNAL MEDICINE

## 2021-01-08 PROCEDURE — 71045 X-RAY EXAM CHEST 1 VIEW: CPT

## 2021-01-08 PROCEDURE — 74011000258 HC RX REV CODE- 258: Performed by: INTERNAL MEDICINE

## 2021-01-08 PROCEDURE — 74018 RADEX ABDOMEN 1 VIEW: CPT

## 2021-01-08 PROCEDURE — 93970 EXTREMITY STUDY: CPT

## 2021-01-08 PROCEDURE — 85027 COMPLETE CBC AUTOMATED: CPT

## 2021-01-08 PROCEDURE — 74011250636 HC RX REV CODE- 250/636: Performed by: NURSE PRACTITIONER

## 2021-01-08 PROCEDURE — 94003 VENT MGMT INPAT SUBQ DAY: CPT

## 2021-01-08 PROCEDURE — 80048 BASIC METABOLIC PNL TOTAL CA: CPT

## 2021-01-08 PROCEDURE — P9045 ALBUMIN (HUMAN), 5%, 250 ML: HCPCS | Performed by: NURSE PRACTITIONER

## 2021-01-08 PROCEDURE — 82962 GLUCOSE BLOOD TEST: CPT

## 2021-01-08 PROCEDURE — 36592 COLLECT BLOOD FROM PICC: CPT

## 2021-01-08 RX ORDER — MAG HYDROX/ALUMINUM HYD/SIMETH 200-200-20
30 SUSPENSION, ORAL (FINAL DOSE FORM) ORAL
Status: DISCONTINUED | OUTPATIENT
Start: 2021-01-08 | End: 2021-01-14 | Stop reason: HOSPADM

## 2021-01-08 RX ORDER — HYDROCODONE BITARTRATE AND ACETAMINOPHEN 7.5; 325 MG/15ML; MG/15ML
5 SOLUTION ORAL ONCE
Status: COMPLETED | OUTPATIENT
Start: 2021-01-08 | End: 2021-01-08

## 2021-01-08 RX ORDER — HEPARIN SODIUM 5000 [USP'U]/ML
5000 INJECTION, SOLUTION INTRAVENOUS; SUBCUTANEOUS EVERY 12 HOURS
Status: DISCONTINUED | OUTPATIENT
Start: 2021-01-08 | End: 2021-01-14 | Stop reason: HOSPADM

## 2021-01-08 RX ORDER — DIPHENHYDRAMINE HYDROCHLORIDE 50 MG/ML
12.5 INJECTION, SOLUTION INTRAMUSCULAR; INTRAVENOUS ONCE
Status: COMPLETED | OUTPATIENT
Start: 2021-01-08 | End: 2021-01-08

## 2021-01-08 RX ADMIN — MIDODRINE HYDROCHLORIDE 10 MG: 5 TABLET ORAL at 08:37

## 2021-01-08 RX ADMIN — DIPHENHYDRAMINE HYDROCHLORIDE 25 MG: 25 CAPSULE ORAL at 00:18

## 2021-01-08 RX ADMIN — MIDODRINE HYDROCHLORIDE 10 MG: 5 TABLET ORAL at 12:28

## 2021-01-08 RX ADMIN — FAMOTIDINE 20 MG: 20 TABLET, FILM COATED ORAL at 08:37

## 2021-01-08 RX ADMIN — ALTEPLASE 1 MG: 2.2 INJECTION, POWDER, LYOPHILIZED, FOR SOLUTION INTRAVENOUS at 16:48

## 2021-01-08 RX ADMIN — DIPHENHYDRAMINE HYDROCHLORIDE 12.5 MG: 50 INJECTION, SOLUTION INTRAMUSCULAR; INTRAVENOUS at 17:11

## 2021-01-08 RX ADMIN — Medication 10 ML: at 06:00

## 2021-01-08 RX ADMIN — IPRATROPIUM BROMIDE AND ALBUTEROL SULFATE 3 ML: .5; 3 SOLUTION RESPIRATORY (INHALATION) at 19:49

## 2021-01-08 RX ADMIN — IRON SUCROSE 200 MG: 20 INJECTION, SOLUTION INTRAVENOUS at 14:10

## 2021-01-08 RX ADMIN — IPRATROPIUM BROMIDE AND ALBUTEROL SULFATE 3 ML: .5; 3 SOLUTION RESPIRATORY (INHALATION) at 13:02

## 2021-01-08 RX ADMIN — IPRATROPIUM BROMIDE AND ALBUTEROL SULFATE 3 ML: .5; 3 SOLUTION RESPIRATORY (INHALATION) at 07:34

## 2021-01-08 RX ADMIN — IPRATROPIUM BROMIDE AND ALBUTEROL SULFATE 3 ML: .5; 3 SOLUTION RESPIRATORY (INHALATION) at 02:49

## 2021-01-08 RX ADMIN — HYDROCODONE BITARTRATE AND ACETAMINOPHEN 5 MG: 7.5; 325 SOLUTION ORAL at 01:19

## 2021-01-08 RX ADMIN — Medication 3 MG: at 00:18

## 2021-01-08 RX ADMIN — CHLORHEXIDINE GLUCONATE 15 ML: 0.12 RINSE ORAL at 08:37

## 2021-01-08 RX ADMIN — PIPERACILLIN AND TAZOBACTAM 3.38 G: 3; .375 INJECTION, POWDER, LYOPHILIZED, FOR SOLUTION INTRAVENOUS at 01:16

## 2021-01-08 RX ADMIN — Medication 10 ML: at 14:11

## 2021-01-08 RX ADMIN — DEXTROSE MONOHYDRATE 4 MCG/MIN: 50 INJECTION, SOLUTION INTRAVENOUS at 04:11

## 2021-01-08 RX ADMIN — PIPERACILLIN AND TAZOBACTAM 3.38 G: 3; .375 INJECTION, POWDER, LYOPHILIZED, FOR SOLUTION INTRAVENOUS at 12:28

## 2021-01-08 RX ADMIN — HEPARIN SODIUM 5000 UNITS: 5000 INJECTION INTRAVENOUS; SUBCUTANEOUS at 16:03

## 2021-01-08 RX ADMIN — MIDODRINE HYDROCHLORIDE 10 MG: 5 TABLET ORAL at 16:03

## 2021-01-08 RX ADMIN — CHLORHEXIDINE GLUCONATE 15 ML: 0.12 RINSE ORAL at 22:47

## 2021-01-08 RX ADMIN — ALBUMIN (HUMAN) 25 G: 12.5 INJECTION, SOLUTION INTRAVENOUS at 00:18

## 2021-01-08 NOTE — PROGRESS NOTES
[facility]    ICU TEAM Progress Note    Name: Isabell Gregorio   : 1950   MRN: [id number]   Date: 2021      Assessment     ICU Problems:    1. Hypotension   2. ESRD on iHD MWF  3. Chronic hypoxic respiratory failure sp trach       ICU Comprehensive Plan of Care:     Plans for this Shift:     1. Nephrology consulted  2. Continue Midodrine   3. Continue vent support, wean as tolerated and appropriate to keep sats >92%  4. Trach care  5. SBP Goal of: < 140 mmHg  6. MAP Goal of: > 65 mmHg  7. None - For above SBP/MAP goals  8. Transfusion Trigger (Hgb): <7 g/dL  9. Respiratory Goals:  a. Chlorhexidine   b. Goal Tidal Volume 6 cc/kg based on IBW  c. Aim for lung protective ventilation  d. Head of bed > 30 degrees  e. Aggressive bronchopulmonary hygiene  f. Incentive spirometry  10. Pulmonary toilet: Duo-Nebs   11. SpO2 Goal: > 92%  12. Keep K>4; Mg>2   13. PT/OT: PT consulted and on board, OT consulted and on board and Speech therapy consulted and on board   14. Goals of Care Discussion with family Pending   13. Plan of Care/Code Status: Full Code  16. Appreciate Consultants Input Nephrology   17. Discussed Care Plan with Bedside RN  18. Documentation of Current Medications  19.  Rest of Plan Below:    F - Feeding:  Yes per peg  A - Analgesia: Oxycodone  S - Sedation: None  T - DVT Prophylaxis: SCD's or Sequential Compression Device   H - Head of Bed: > 30 Degrees  U - Ulcer Prophylaxis: Protonix (pantoprazole)   G - Glycemic Control: Insulin  S - Spontaneous Breathing Trial: Pending  B - Bowel Regimen: MiraLax  I - Indwelling Catheter:   Tubes: Tracheostomy  Lines: Peripheral IV  Drains: Hong Catheter  D - De-escalation of Antibiotics: Vancomycin  Zosyn    Subjective:   Progress Note: 2021      Reason for ICU Admission: Hypotension     HPI:Rowdy Dover is a 72 y.o. male with a past medical history of chronic respiratory failure s/p trach/PEG and ESRD on MWF dialysis who presented from [facility] for hypotension during dialysis. On arrival, patient hemodynamically stable, normotensive, and on baseline ventilator settings. Patient unable to provide history and no documentation provided from [facility]. CXR in ED consistent with pulmonary edema and R lung infiltrate. Patient treated to VAP. ICU called for admission given chronic ventilator requirement. Overnight Events:   1/8/2021      POD:  * No surgery found *    S/P:       Active Problem List:     Problem List  Never Reviewed          Codes Class    Hypotension ICD-10-CM: I95.9  ICD-9-CM: 458.9               Past Medical History:      has no past medical history on file. Past Surgical History:      has no past surgical history on file. Home Medications:     Prior to Admission medications    Medication Sig Start Date End Date Taking? Authorizing Provider   ammonium lactate (LAC-HYDRIN) 12 % lotion Apply  to affected area two (2) times a day. rub in to affected area well   Yes Provider, Historical   famotidine (PEPCID) 20 mg tablet Take 20 mg by mouth daily. Yes Provider, Historical   heparin sodium,porcine (heparin, porcine,) 5,000 unit/mL injection 5,000 Units by SubCUTAneous route every twelve (12) hours. Yes Provider, Historical   lactobacillus-acidophilus (LACTINEX) 100 million cell grpk Take 1 Packet by mouth two (2) times a day. (Floranex)   Yes Provider, Historical   insulin regular (NOVOLIN R, HUMULIN R) 100 unit/mL injection by SubCUTAneous route. Sliding scale at [facility], Historical   guaiFENesin (ROBITUSSIN) 100 mg/5 mL liquid Take 200 mg by mouth every six (6) hours. Yes Provider, Historical   levothyroxine (Synthroid) 100 mcg tablet Take 100 mcg by mouth daily. Yes Provider, Historical   melatonin 3 mg tablet Take 3 mg by mouth nightly. Yes Provider, Historical   midodrine (PROAMATINE) 5 mg tablet Take 15 mg by mouth every eight (8) hours.    Yes Provider, Historical   ondansetron (ZOFRAN) 2 mg/mL injection 4 mg by IntraVENous route every four (4) hours as needed for Nausea. Yes Provider, Historical   vancomycin HCl (VANCOMYCIN PO) Take 500 mg by mouth every six (6) hours. Indications: diarrhea from an infection with Clostridium difficile bacteria, C diff positive 20 at [facility] 20  Yes Provider, Historical   acetaminophen (TYLENOL) 650 mg/20.3 mL solution Take 650 mg by mouth every six (6) hours as needed for Fever (pain). Yes Provider, Historical   albuterol (PROVENTIL VENTOLIN) 2.5 mg /3 mL (0.083 %) nebu 2.5 mg by Nebulization route three (3) times daily as needed for Wheezing, Shortness of Breath or Respiratory Distress. Yes Provider, Historical   albuterol-ipratropium (DUO-NEB) 2.5 mg-0.5 mg/3 ml nebu 3 mL by Nebulization route three (3) times daily as needed for Wheezing, Shortness of Breath or Respiratory Distress. Yes Provider, Historical   chlorhexidine (PERIDEX) 0.12 % solution 15 mL by Swish and Spit route three (3) times daily as needed (chronic ventilation - mouth swab). Yes Provider, Historical       Allergies/Social/Family History:     No Known Allergies   Social History     Tobacco Use    Smoking status: Not on file   Substance Use Topics    Alcohol use: Not on file      No family history on file. Review of Systems:     A comprehensive review of systems was negative except for that written in the HPI. Objective:   Vital Signs:  Visit Vitals  /69   Pulse 93   Temp 98.8 °F (37.1 °C)   Resp (!) 31   Ht 6' (1.829 m)   Wt 76.9 kg (169 lb 8.5 oz)   SpO2 96%   BMI 22.99 kg/m²      O2 Device: Tracheostomy, Ventilator Temp (24hrs), Av.5 °F (36.4 °C), Min:95.4 °F (35.2 °C), Max:98.8 °F (37.1 °C)           Intake/Output:     Intake/Output Summary (Last 24 hours) at 2021 1248  Last data filed at 2021 1228  Gross per 24 hour   Intake 1516.76 ml   Output 2500 ml   Net -983.24 ml       Physical Exam:    General:  Trach on vent, comfortable    Eyes:  Sclera anicteric.  Pupils equal, round, reactive to light. Mouth/Throat: Orotracheal tube in place. Neck: Supple. Lungs:   Clear to auscultation bilaterally, good effort. Cardiovascular:  Regular rate and rhythm, no murmur, click, rub, or gallop. Abdomen:   Soft, non-tender, bowel sounds normal, non-distended. Extremities: No cyanosis or edema. Skin: No acute rash or lesions. Lymph Nodes: Cervical and supraclavicular normal.   Musculoskeletal:  No swelling or deformity. Lines/Devices:  Intact, no erythema, drainage, or tenderness. LABS AND  DATA: Personally reviewed  Recent Labs     01/08/21  0923 01/06/21  2305   WBC 13.7* 14.8*   HGB 7.5* 8.1*   HCT 23.5* 25.9*    260     Recent Labs     01/08/21  0422 01/06/21  2305    132*   K 4.0 4.1    98   CO2 24 30   BUN 26* 41*   CREA 2.18* 2.85*   GLU 66 69   CA 9.7 11.4*   MG  --  2.6*   PHOS  --  5.5*     No results for input(s): AP, TBIL, TP, ALB, GLOB, AML, LPSE in the last 72 hours. No lab exists for component: SGOT, GPT, AMYP  No results for input(s): INR, PTP, APTT, INREXT in the last 72 hours. No results for input(s): PHI, PCO2I, PO2I, FIO2I in the last 72 hours. Recent Labs     01/06/21  2305   TROIQ <0.05       Hemodynamics:   PAP:   CO:     Wedge:   CI:     CVP:    SVR:       PVR:       Ventilator Settings:  Mode Rate Tidal Volume Pressure FiO2 PEEP   Spontaneous   500 ml  10 cm H2O 30 % 5 cm H20     Peak airway pressure: 32 cm H2O    Minute ventilation: 9.48 l/min        MEDS: Reviewed    Chest X-Ray:  CXR Results  (Last 48 hours)               01/08/21 0511  XR CHEST PORT Final result    Impression:  IMPRESSION: Decreased pulmonary edema bilaterally some residual residual   airspace opacities in the right lung. Narrative:  EXAM: XR CHEST PORT       INDICATION: pulmonary edema       COMPARISON: 1/7/2021       FINDINGS: A portable AP radiograph of the chest was obtained at 451 hours. Tubes   and lines are in stable position. . There is decreased pulmonary edema with   improved aeration bilaterally. Some residual airspace and interstitial opacities   in the right lung. David Sorensen Heart size is borderline. .  The bones and soft tissues are   grossly within normal limits. 01/07/21 0034  XR CHEST PORT Final result    Impression:  IMPRESSION: Pulmonary edema with airspace infiltrate in the right lung. Tubes   and lines as above. Narrative:  EXAM: XR CHEST PORT       INDICATION: ?pulmonary edema       COMPARISON: Chest x-ray 8/29/2020. FINDINGS: A portable AP radiograph of the chest was obtained at 00:19 hours. The   patient is on a cardiac monitor. Right hemodialysis catheter has been placed   which traverses expected course of terminate in the region of the right atrium. Tracheostomy in place. Left central venous catheter traverses expected course of   terminate in the proximal superior vena cava. There is airspace opacity in the   right lung with interstitial prominence and Kerley B line's diffusely. The   cardiac and mediastinal contours and pulmonary vascularity are normal.  Chest   wall structures show multiple left-sided rib fractures with resolution of chest   wall emphysema seen previously. .                  Multidisciplinary Rounds Completed: Yes    ABCDEF Bundle/Checklist Completed:  Yes    SPECIAL EQUIPMENT  IHD    DISPOSITION  Stay in ICU    CRITICAL CARE CONSULTANT NOTE  I had a face to face encounter with the patient, reviewed and interpreted patient data including clinical events, labs, images, vital signs, I/O's, and examined patient. I have discussed the case and the plan and management of the patient's care with the consulting services, the bedside nurses and the respiratory therapist.      NOTE OF PERSONAL INVOLVEMENT IN CARE   This patient has a high probability of imminent, clinically significant deterioration, which requires the highest level of preparedness to intervene urgently.  I participated in the decision-making and personally managed or directed the management of the following life and organ supporting interventions that required my frequent assessment to treat or prevent imminent deterioration. I personally spent 60 minutes of critical care time. This is time spent at this critically ill patient's bedside actively involved in patient care as well as the coordination of care and discussions with the patient's family. This does not include any procedural time which has been billed separately.     Rita Buckley NP    [facility], [place] Critical [facility]  1/8/2021

## 2021-01-08 NOTE — PROGRESS NOTES
Name: Destiney Felix MRN: [id number]   : 1950 Hospital: [address]   Date: 2021        IMPRESSION:   · ESRD on HD. Patient was transferred from [facility] for management of accumulated edema and hypervolemia. Patient was not able to have adequate UF for the last 2 weeks. Patient had a successful HD/UF treatment yesterday with removal of 2.5 kg using variable Na Rx and albumin infusions. · Chronic hypervolemia with peripheral edema and pulmonary edema- improved after HD. CXR- improved aeration. 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 · If patient is able to tolerate removal of another 1.9-0 kg without complications he may be stable to be sent back to [facility]. · The rest of management- as per primary team  · Start venofer and resume Epogen when the iron sats are above 20%     Subjective/Interval History:   I have reviewed the flowsheet and previous days notes. ROS:Review of systems not obtained due to patient factors. Objective:   Vital Signs:    Visit Vitals  /68   Pulse 91   Temp 98.8 °F (37.1 °C)   Resp (!) 32   Ht 6' (1.829 m)   Wt 76.9 kg (169 lb 8.5 oz)   SpO2 95%   BMI 22.99 kg/m²       O2 Device: Tracheostomy, Ventilator       Temp (24hrs), Av.5 °F (36.4 °C), Min:95.4 °F (35.2 °C), Max:98.8 °F (37.1 °C)       Intake/Output:   Last shift:      701 - 1900  In: 215.5 [I.V.:165.5]  Out: -   Last 3 shifts: 1901 -  0700  In: 1301.3 [I.V.:1081.3]  Out: 2500     Intake/Output Summary (Last 24 hours) at 2021 1328  Last data filed at 2021 1228  Gross per 24 hour   Intake 1516.76 ml   Output 2500 ml   Net -983.24 ml        Physical Exam:  General:    Alert, cooperative, no distress, appears stated age. On vent   Head:   Normocephalic, without obvious abnormality, atraumatic. Eyes:   Conjunctivae/corneas clear.   Corrective glasses are on  Nose:  Nares normal. No drainage or sinus tenderness. Throat:    Lips, mucosa, and tongue normal.    Neck:  Trach in.  Lungs:   Decreased to auscultation bilaterally. Few Rhonchi. No rales. Chest wall:  No tenderness or deformity. No Accessory muscle use. Heart:   Regular rate and rhythm,  no murmur, rub or gallop. Abdomen:   Soft, non-tender. Not distended. Bowel sounds normal. No masses  Extremities: Extremities normal, atraumatic, No cyanosis. No edema. No clubbing  Skin:     Texture, turgor normal. No rashes or lesions. Not Jaundiced  Psych:  calm. Not depressed. Not anxious or agitated. DATA:  Labs:  Recent Labs     01/08/21  0422 01/06/21  2305    132*   K 4.0 4.1    98   CO2 24 30   BUN 26* 41*   CREA 2.18* 2.85*   CA 9.7 11.4*   PHOS  --  5.5*   MG  --  2.6*     Recent Labs     01/08/21  0923 01/06/21  2305   WBC 13.7* 14.8*   HGB 7.5* 8.1*   HCT 23.5* 25.9*    260     No results for input(s): TETE, KU, CLU, CREAU in the last 72 hours.     No lab exists for component: PROU    Total time spent with patient:  35 minutes    [] Critical Care Provided    Care Plan discussed with:   Staff, Medical Team    Stanford Camilo MD

## 2021-01-08 NOTE — PROGRESS NOTES
0730 Bedside and Verbal shift change report given to ANDRY GOMEZ RN (oncoming nurse) by Olayinka ELLISON RN (offgoing nurse). Report included the following information SBAR, Kardex, ED Summary, Intake/Output, MAR, Accordion, Recent Results, Med Rec Status, Cardiac Rhythm SR/BIGEMINY and Alarm Parameters . [address] dwelling in red [facility]. [address]  now gives blood return. 4048 TRANSFER - OUT REPORT:    Verbal report given to Nathalie Gomes RN(name) on Taylor Kay  being transferred to CCU(unit) for routine progression of care       Report consisted of patients Situation, Background, Assessment and   Recommendations(SBAR). Information from the following report(s) SBAR, Kardex, ED Summary, Intake/Output, MAR, Accordion, Recent Results, Med Rec Status, Cardiac Rhythm SR/BIGEMINY and Alarm Parameters  was reviewed with the receiving nurse. Lines:   PICC Double Lumen 01/07/21 Left; Other(comment) (Active)   Central Line Being Utilized Yes 01/08/21 1600   Criteria for Appropriate Use Long term IV/antibiotic administration 01/08/21 1600   Site Assessment Clean, dry, & intact 01/08/21 1600   Phlebitis Assessment 0 01/08/21 1600   Infiltration Assessment 0 01/08/21 1600   Date of Last Dressing Change 01/07/21 01/08/21 1600   Dressing Status Clean, dry, & intact 01/08/21 1600   Action Taken Open ports on tubing capped 01/08/21 1600   Dressing Type Disk with Chlorhexadine gluconate (CHG); Transparent 01/08/21 1600   Hub Color/Line Status Red; Infusing 01/08/21 1600   Positive Blood Return (Site #1) No 01/08/21 1600   Hub Color/Line Status Purple;Flushed;Cap end changed 01/08/21 1600   Positive Blood Return (Site #2) Yes 01/08/21 1600   Alcohol Cap Used Yes 01/08/21 1600        Opportunity for questions and clarification was provided.       Patient transported with:   Monitor  O2 @ ON VENT liters  Registered Nurse  Tech

## 2021-01-08 NOTE — PROGRESS NOTES
Rec'd patient from ED via stretcher. Placed in bed, Trached on vent. SIMV, 12, ,PS 12, P 5 fio2 50%. Made comfortable  Placed on monitor. Noted BI Larry.   Noted peg tube, Right sub calv Leland cath  Left IJ double lumen Picc. Patient is alert and follows direction. Glasses and cell phone with patient. Primary Nurse Natalio Guerrero RN and Coco Lee RN performed a dual skin assessment on this patient No impairment noted  Rob score is 11    Bedside and Verbal shift change report given to Mack Kennedy (oncoming nurse) by Erasmo Long (offgoing nurse).  Report included the following information
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
## 2021-01-08 NOTE — PROGRESS NOTES
1743 TRANSFER - IN REPORT: bedside report received from Madison(name) on Marcie Persons  being received from ICU(unit) for routine progression of care  Report consisted of patients Situation, Background, Assessment and Recommendations(SBAR). Information from the following report(s) SBAR, Intake/Output, MAR, Recent Results and Cardiac Rhythm NSR was reviewed with the receiving nurse. Opportunity for questions and clarification was provided. Assessment completed upon patients arrival to unit and care assumed. Pt arrived to CCU. Bedside report received from Elenita Barth and Levophed gtt verified. Primary Nurse Marco A Messer, NINFA and Dionicio Lucas RN performed a dual skin assessment on this patient No impairment noted Rob score is 12 Patient resting on Nick Intouch bed.    1753 /87(100); Levophed gtt decreased. See MAR for further titration details. 1930 Bedside shift change report given to Titi (oncoming nurse) by Sebas Traylor (offgoing nurse). Report included the following information SBAR, Intake/Output, MAR, Recent Results and Cardiac Rhythm NSR.

## 2021-01-08 NOTE — PROGRESS NOTES
1930: Bedside shift change report given to [address] [place] [address] (oncoming nurse) by [place]ata (offgoing nurse). Report included the following information SBAR, Kardex, ED Summary, Intake/Output, MAR, Recent Results, Cardiac Rhythm bigeminy and Alarm Parameters . Summary: A/Ox4, follows commands, mouths words and writes words on whiteboard to communicate. Trached on SIMV, PEG tube in place. Pt hypotensive, started on norepinephrine drip after no improvement in BP w albumin bolus x2. Bilateral duplex studies ordered for leg tenderness and swelling. Also complaining of abdominal pain and tenderness, KUB completed. Pt on scarlet hugger, temps below 95 at midnight, rectal temp 97.3F at 0700. Wife updated over the phone. 0730: Bedside shift change report given to [id number] (oncoming nurse) by [address] [place] [address] (offgoing nurse). Report included the following information SBAR, Kardex, Intake/Output, MAR, Recent Results, Med Rec Status, Cardiac Rhythm NSR and Alarm Parameters .

## 2021-01-08 NOTE — PROGRESS NOTES
01/08/21 1114   Weaning Parameters   Spontaneous Breathing Trial Complete No (Comments)   Resp Rate Observed 41   Ve 8.9      RSBI 193   SBT Results  Patient returned to previous vent settings at this time. RN at bedside. Will continue to monitor patient.

## 2021-01-08 NOTE — PROGRESS NOTES
Comprehensive Nutrition Assessment    Type and Reason for Visit: Initial    Nutrition Recommendations/Plan:    1. Start tube feeds via PEG:     - Nepro @ 50ml/hr x 20hrs (hold 2hrs before and after oral synthroid once resumed) + 30ml flush q4hr   - Increase flush to goal per MD (pending fluid status):  125ml flush q4hr. 2. Zero bedscale as able an reweigh - 20# wt difference from [facility]  3. Add Neprovite per renal    Nutrition Assessment:    Pt admitted for hypotension from [facility]. PMHx: ESRD, CAD, HTN CVA, c.diff, CKD4, /p trach & PEG. Multiple admits to [facility] since [address] in 8/2020. Splenectomy on 9/2 and pericardial window 10/30. New to HD during these various admits. Hypotension while on dialysis PTA. Some abd pain noted on admit. KUB negative for obstruction. Low iron, s/p venofer. Diarrhea an issue prior to transfer with FMS at some point at [facility], also with hx of c.diff noted. No FMS in place at this time, just smears today per RN. Recommend checking zinc levels. Pt visited but did not wake to name, can communicate with white board per notes. Spoke to [facility] RD via phone. Pt getting Nepro @ 45ml/hr (with 22hr schedule). Had been reduced from previous rate of 55ml/hr with concerns for fluid overload. Manual water flushes being given so RD there questioning frequency. Pt is on synthroid. Recommend: Nepro @ 50ml/hr x 20hrs (hold 2hrs before and after oral synthroid once resumed) + 125ml flush q4hr. Provides: 1000ml, 1800kcal, 81g protein, 727ml fluid + 750ml flush = 1477ml fluid. Meets 100% needs. Will follow for wt trends with adjustment to energy calculations and tube feeds pending trends. Muscle wasting observed but with limit mobility had to determine if muscle atrophy vs nutrition status. Malnutrition Assessment:  Malnutrition Status:   At risk for malnutrition (specify)(acute illness, diarrhea, EN dependent, HD)    Context:  Chronic illness     Nutritionally Significant Medications: pepcid, midodrine, zosyn, ronny drip (7mcg/min); PRN: miralax, phenergan/zofran    Estimated Daily Nutrient Needs:  Energy (kcal): 1694kcal([facility] 2003B); Weight Used for Energy Requirements: Current(169#)  Protein (g): 80-94g (1.2-1.4g/kg);  Weight Used for Protein Requirements: Other (specify)(67kg - [facility])  Fluid (ml/day): 1500 - or per renal; Method Used for Fluid Requirements: Standard renal    9.48 l/min    Temp (24hrs), Av.5 °F (36.4 °C), Min:95.4 °F (35.2 °C), Max:98.8 °F (37.1 °C)    Nutrition Related Findings:       BM:  - loose  Edema: 1+ BLLE, 2+ BLLE  Wounds:  Stage II(sacral)        Current Nutrition Therapies:   Diet: npo    Anthropometric Measures:  · Height:  6' (182.9 cm)  · Current Body Wt:  67.1 kg (148 lb)   · Admission Body Wt:       · Usual Body Wt:        · Ideal Body Wt:   :  83.1 %   Wt Readings from Last 10 Encounters:   21 76.9 kg (169 lb 8.5 oz)     Nutrition Diagnosis:   · Increased nutrient needs related to renal dysfunction(wounds) as evidenced by dialysis, wounds(stage 2 sacral)    · Inadequate oral intake related to swallowing difficulty as evidenced by nutrition support-enteral nutrition(PEG)    · Altered GI function related to (?etiology) as evidenced by diarrhea    Nutrition Interventions:   Food and/or Nutrient Delivery: Start tube feeding  Nutrition Education and Counseling: No recommendations at this time  Coordination of Nutrition Care: Continue to monitor while inpatient, Interdisciplinary rounds    Goals:  EN meeting at least 90% needs in 3-4 days; wt maintenance       Nutrition Monitoring and Evaluation:   Behavioral-Environmental Outcomes: None identified  Food/Nutrient Intake Outcomes: Enteral nutrition intake/tolerance  Physical Signs/Symptoms Outcomes: Weight, Fluid status or edema, Hemodynamic status, Diarrhea    Discharge Planning:    Enteral nutrition     Andres Koehler, RD  [address], Pager #[phone] or via [employer]

## 2021-01-09 ENCOUNTER — APPOINTMENT (OUTPATIENT)
Dept: NON INVASIVE DIAGNOSTICS | Age: 71
DRG: 207 | End: 2021-01-09
Attending: NURSE PRACTITIONER
Payer: MEDICARE

## 2021-01-09 ENCOUNTER — APPOINTMENT (OUTPATIENT)
Dept: GENERAL RADIOLOGY | Age: 71
DRG: 207 | End: 2021-01-09
Attending: INTERNAL MEDICINE
Payer: MEDICARE

## 2021-01-09 LAB
ANION GAP SERPL CALC-SCNC: 8 MMOL/L (ref 5–15)
BUN SERPL-MCNC: 29 MG/DL (ref 6–20)
BUN/CREAT SERPL: 10 (ref 12–20)
CALCIUM SERPL-MCNC: 9.5 MG/DL (ref 8.5–10.1)
CHLORIDE SERPL-SCNC: 103 MMOL/L (ref 97–108)
CO2 SERPL-SCNC: 27 MMOL/L (ref 21–32)
CREAT SERPL-MCNC: 2.99 MG/DL (ref 0.7–1.3)
ECHO AO ROOT DIAM: 3.25 CM
ECHO AV AREA PEAK VELOCITY: 2.09 CM2
ECHO AV AREA/BSA PEAK VELOCITY: 1.1 CM2/M2
ECHO AV PEAK GRADIENT: 6.14 MMHG
ECHO AV PEAK VELOCITY: 123.91 CM/S
ECHO EST RA PRESSURE: 8 MMHG
ECHO LA AREA 4C: 18.78 CM2
ECHO LA MAJOR AXIS: 3.34 CM
ECHO LA MINOR AXIS: 1.7 CM
ECHO LA VOL 2C: 44.97 ML (ref 18–58)
ECHO LA VOL 4C: 40.76 ML (ref 18–58)
ECHO LA VOL BP: 52.42 ML (ref 18–58)
ECHO LA VOL/BSA BIPLANE: 26.63 ML/M2 (ref 16–28)
ECHO LA VOLUME INDEX A2C: 22.85 ML/M2 (ref 16–28)
ECHO LA VOLUME INDEX A4C: 20.71 ML/M2 (ref 16–28)
ECHO LV INTERNAL DIMENSION DIASTOLIC: 4.33 CM (ref 4.2–5.9)
ECHO LV INTERNAL DIMENSION SYSTOLIC: 2.78 CM
ECHO LV IVSD: 0.79 CM (ref 0.6–1)
ECHO LV MASS 2D: 110.1 G (ref 88–224)
ECHO LV MASS INDEX 2D: 55.9 G/M2 (ref 49–115)
ECHO LV POSTERIOR WALL DIASTOLIC: 0.85 CM (ref 0.6–1)
ECHO LVOT DIAM: 1.9 CM
ECHO LVOT PEAK GRADIENT: 3.32 MMHG
ECHO LVOT PEAK VELOCITY: 91.12 CM/S
ECHO MV A VELOCITY: 65.49 CM/S
ECHO MV AREA PHT: 4.59 CM2
ECHO MV E DECELERATION TIME (DT): 165.42 MS
ECHO MV E VELOCITY: 96.34 CM/S
ECHO MV E/A RATIO: 1.47
ECHO MV PRESSURE HALF TIME (PHT): 47.97 MS
ECHO PV PEAK INSTANTANEOUS GRADIENT SYSTOLIC: 1.32 MMHG
ECHO RIGHT VENTRICULAR SYSTOLIC PRESSURE (RVSP): 35.86 MMHG
ECHO RV INTERNAL DIMENSION: 4.77 CM
ECHO RV TAPSE: 1.54 CM (ref 1.5–2)
ECHO TV REGURGITANT MAX VELOCITY: 263.93 CM/S
ECHO TV REGURGITANT PEAK GRADIENT: 27.86 MMHG
ERYTHROCYTE [DISTWIDTH] IN BLOOD BY AUTOMATED COUNT: 17.2 % (ref 11.5–14.5)
GLUCOSE SERPL-MCNC: 74 MG/DL (ref 65–100)
HCT VFR BLD AUTO: 22.2 % (ref 36.6–50.3)
HGB BLD-MCNC: 7.1 G/DL (ref 12.1–17)
MCH RBC QN AUTO: 28.1 PG (ref 26–34)
MCHC RBC AUTO-ENTMCNC: 32 G/DL (ref 30–36.5)
MCV RBC AUTO: 87.7 FL (ref 80–99)
NRBC # BLD: 0.5 K/UL (ref 0–0.01)
NRBC BLD-RTO: 3.5 PER 100 WBC
PLATELET # BLD AUTO: 295 K/UL (ref 150–400)
PMV BLD AUTO: 11 FL (ref 8.9–12.9)
POTASSIUM SERPL-SCNC: 3.5 MMOL/L (ref 3.5–5.1)
RBC # BLD AUTO: 2.53 M/UL (ref 4.1–5.7)
SODIUM SERPL-SCNC: 138 MMOL/L (ref 136–145)
WBC # BLD AUTO: 14.1 K/UL (ref 4.1–11.1)

## 2021-01-09 PROCEDURE — 74011250636 HC RX REV CODE- 250/636: Performed by: INTERNAL MEDICINE

## 2021-01-09 PROCEDURE — 74011250636 HC RX REV CODE- 250/636: Performed by: NURSE PRACTITIONER

## 2021-01-09 PROCEDURE — 80048 BASIC METABOLIC PNL TOTAL CA: CPT

## 2021-01-09 PROCEDURE — 97162 PT EVAL MOD COMPLEX 30 MIN: CPT

## 2021-01-09 PROCEDURE — 90935 HEMODIALYSIS ONE EVALUATION: CPT

## 2021-01-09 PROCEDURE — 97530 THERAPEUTIC ACTIVITIES: CPT

## 2021-01-09 PROCEDURE — 77030018798 HC PMP KT ENTRL FED COVD -A

## 2021-01-09 PROCEDURE — 71045 X-RAY EXAM CHEST 1 VIEW: CPT

## 2021-01-09 PROCEDURE — 74011250637 HC RX REV CODE- 250/637: Performed by: NURSE PRACTITIONER

## 2021-01-09 PROCEDURE — 85027 COMPLETE CBC AUTOMATED: CPT

## 2021-01-09 PROCEDURE — 94003 VENT MGMT INPAT SUBQ DAY: CPT

## 2021-01-09 PROCEDURE — 74011000258 HC RX REV CODE- 258: Performed by: NURSE PRACTITIONER

## 2021-01-09 PROCEDURE — 93306 TTE W/DOPPLER COMPLETE: CPT

## 2021-01-09 PROCEDURE — 65620000000 HC RM CCU GENERAL

## 2021-01-09 PROCEDURE — 74011000250 HC RX REV CODE- 250: Performed by: ANESTHESIOLOGY

## 2021-01-09 PROCEDURE — 84630 ASSAY OF ZINC: CPT

## 2021-01-09 PROCEDURE — 74011000250 HC RX REV CODE- 250: Performed by: NURSE PRACTITIONER

## 2021-01-09 PROCEDURE — 94640 AIRWAY INHALATION TREATMENT: CPT

## 2021-01-09 PROCEDURE — 93306 TTE W/DOPPLER COMPLETE: CPT | Performed by: INTERNAL MEDICINE

## 2021-01-09 PROCEDURE — 74011250637 HC RX REV CODE- 250/637: Performed by: INTERNAL MEDICINE

## 2021-01-09 PROCEDURE — 36415 COLL VENOUS BLD VENIPUNCTURE: CPT

## 2021-01-09 RX ORDER — HEPARIN SODIUM 1000 [USP'U]/ML
2000 INJECTION, SOLUTION INTRAVENOUS; SUBCUTANEOUS
Status: DISCONTINUED | OUTPATIENT
Start: 2021-01-09 | End: 2021-01-14 | Stop reason: HOSPADM

## 2021-01-09 RX ORDER — BALSAM PERU/CASTOR OIL
OINTMENT (GRAM) TOPICAL 2 TIMES DAILY
Status: DISCONTINUED | OUTPATIENT
Start: 2021-01-09 | End: 2021-01-14 | Stop reason: HOSPADM

## 2021-01-09 RX ORDER — MIDODRINE HYDROCHLORIDE 5 MG/1
10 TABLET ORAL EVERY 8 HOURS
Status: DISCONTINUED | OUTPATIENT
Start: 2021-01-09 | End: 2021-01-14 | Stop reason: HOSPADM

## 2021-01-09 RX ORDER — FENTANYL CITRATE 50 UG/ML
50 INJECTION, SOLUTION INTRAMUSCULAR; INTRAVENOUS
Status: DISCONTINUED | OUTPATIENT
Start: 2021-01-09 | End: 2021-01-14 | Stop reason: HOSPADM

## 2021-01-09 RX ADMIN — DEXTROSE MONOHYDRATE 2 MCG/MIN: 50 INJECTION, SOLUTION INTRAVENOUS at 16:12

## 2021-01-09 RX ADMIN — ACETAMINOPHEN 650 MG: 325 TABLET ORAL at 16:12

## 2021-01-09 RX ADMIN — HEPARIN SODIUM 5000 UNITS: 5000 INJECTION INTRAVENOUS; SUBCUTANEOUS at 05:00

## 2021-01-09 RX ADMIN — VANCOMYCIN HYDROCHLORIDE 750 MG: 750 INJECTION, POWDER, LYOPHILIZED, FOR SOLUTION INTRAVENOUS at 20:21

## 2021-01-09 RX ADMIN — CHLORHEXIDINE GLUCONATE 15 ML: 0.12 RINSE ORAL at 20:54

## 2021-01-09 RX ADMIN — MIDODRINE HYDROCHLORIDE 10 MG: 5 TABLET ORAL at 13:36

## 2021-01-09 RX ADMIN — CHLORHEXIDINE GLUCONATE 15 ML: 0.12 RINSE ORAL at 08:47

## 2021-01-09 RX ADMIN — HEPARIN SODIUM 5000 UNITS: 5000 INJECTION INTRAVENOUS; SUBCUTANEOUS at 16:12

## 2021-01-09 RX ADMIN — IPRATROPIUM BROMIDE AND ALBUTEROL SULFATE 3 ML: .5; 3 SOLUTION RESPIRATORY (INHALATION) at 14:30

## 2021-01-09 RX ADMIN — HEPARIN SODIUM 2000 UNITS: 1000 INJECTION INTRAVENOUS; SUBCUTANEOUS at 19:07

## 2021-01-09 RX ADMIN — Medication 10 ML: at 13:35

## 2021-01-09 RX ADMIN — IPRATROPIUM BROMIDE AND ALBUTEROL SULFATE 3 ML: .5; 3 SOLUTION RESPIRATORY (INHALATION) at 08:08

## 2021-01-09 RX ADMIN — ACETAMINOPHEN 650 MG: 325 TABLET ORAL at 08:47

## 2021-01-09 RX ADMIN — MIDODRINE HYDROCHLORIDE 10 MG: 5 TABLET ORAL at 08:47

## 2021-01-09 RX ADMIN — Medication 3 MG: at 21:45

## 2021-01-09 RX ADMIN — IPRATROPIUM BROMIDE AND ALBUTEROL SULFATE 3 ML: .5; 3 SOLUTION RESPIRATORY (INHALATION) at 19:22

## 2021-01-09 RX ADMIN — FENTANYL CITRATE 50 MCG: 50 INJECTION, SOLUTION INTRAMUSCULAR; INTRAVENOUS at 12:25

## 2021-01-09 RX ADMIN — ONDANSETRON 4 MG: 2 INJECTION INTRAMUSCULAR; INTRAVENOUS at 08:47

## 2021-01-09 RX ADMIN — PIPERACILLIN AND TAZOBACTAM 3.38 G: 3; .375 INJECTION, POWDER, LYOPHILIZED, FOR SOLUTION INTRAVENOUS at 13:34

## 2021-01-09 RX ADMIN — MIDODRINE HYDROCHLORIDE 10 MG: 5 TABLET ORAL at 21:45

## 2021-01-09 RX ADMIN — PIPERACILLIN AND TAZOBACTAM 3.38 G: 3; .375 INJECTION, POWDER, LYOPHILIZED, FOR SOLUTION INTRAVENOUS at 01:05

## 2021-01-09 RX ADMIN — Medication: at 16:13

## 2021-01-09 RX ADMIN — FAMOTIDINE 20 MG: 20 TABLET, FILM COATED ORAL at 08:47

## 2021-01-09 RX ADMIN — Medication: at 13:35

## 2021-01-09 RX ADMIN — IPRATROPIUM BROMIDE AND ALBUTEROL SULFATE 3 ML: .5; 3 SOLUTION RESPIRATORY (INHALATION) at 02:55

## 2021-01-09 NOTE — PROGRESS NOTES
Day #3 of Vancomycin  Indication:  VAP  -trach, from [place]  -CXR with pulm edema and airspace infiltrate  Current regimen:  750 mg IV PRN HD  Abx regimen:  vanc/pip-tazo  ID Following ?: NO  Inpatient dialysis schedule: ONCE for     Recent Labs     21  0455 21  0923 21  0422 21  2305   WBC 14.1* 13.7*  --  14.8*   CREA  --   --  2.18* 2.85*   BUN  --   --  26* 41*     Est CrCl: ESRD on HD  Temp (24hrs), Av.6 °F (37 °C), Min:98.5 °F (36.9 °C), Max:98.8 °F (37.1 °C)    Cultures:    Blood: NGTD   Resp viral panel: (-)   COVID-19 [rapid & PCR]: (-)    Goal trough = 20 - 25 mcg/mL for therapeutic goal of 15 - 20 mcg/mL (assuming ~35% removal by dialysis)    Date                Dialysis (Yes/No)       Pre-HD Level              Dose          Y                                   --                     2 gm pre-HD & 750 mg post-HD                    N                                   --                     --         Y (planned)                             --                    750 mg post-HD (not ordered yet)    Reminder staff message entered (if needed): YES    Plan: Continue current regimen. One time HD order for today @ 1000. Recommend pre-HD level prior to next HD session. Pharmacy will order one time dose of vancomycin once HD completed.       Lizabeth Cooks, PharmD, BCPP, [facility] Specialist, [facility]

## 2021-01-09 NOTE — PROGRESS NOTES
1930 received bedside report from [facility] VSS, pt on vent w/ Sats >92%, verified Levophed drip    2052 /74 (86) Levophed gtt decreased    2246 /64 (77) Levophed gtt decreased    0000 no change on reassessment, [facility], site re-dressed    0024 /66 (78) Levophed gtt decreased    0200 Tube Feed started per order    0300 Levophed stopped for continued MAP >65    0430 labs sent per order    0730 Bedside shift change report given to Niranjan Vyas (oncoming nurse) by Vaishali Perez RN (offgoing nurse). Report included the following information SBAR, Kardex, Procedure Summary, Intake/Output, MAR, Recent Results and Cardiac Rhythm NSR.

## 2021-01-09 NOTE — PROGRESS NOTES
Problem: Mobility Impaired (Adult and Pediatric)  Goal: *Acute Goals and Plan of Care (Insert Text)  Description: FUNCTIONAL STATUS PRIOR TO ADMISSION: The patient has a trach is a difficult to understand. He was receiving therapy at [facility] prior to admission. HOME SUPPORT PRIOR TO ADMISSION: The patient came from [facility]    Physical Therapy Goals  Initiated 1/9/2021  1. Patient will move from supine to sit and sit to supine  in bed with moderate assistance of 2 within 7 day(s). 2.  Patient will transfer from bed to chair and chair to bed with maximal assistance of 2 using the least restrictive device within 7 day(s). 3.  Patient will perform sit to stand with maximal assistance within 7 day(s). Outcome: Progressing Towards Goal   PHYSICAL THERAPY EVALUATION  Patient: Wiliam Acevedo (84 y.o. male)  Date: 1/9/2021  Primary Diagnosis: Hypotension [I95.9]        Precautions:        ASSESSMENT  Based on the objective data described below, the patient presents with decreased independence with functional mobility, and decreased strength and ROM S/P admission from [address] [place] [address] for hypotension and decreased O2 sats during HD treatment. His PMHx is remarkable for ESRD and PEG placement as well as chronic respiratory failure. He is received supine in bed. He demonstrates the ability to scoot his legs towards the EOB with minimal assistance. Provided bed rail patient is able to provide some assistance for supine to sit transfer but requires 2 people. He demonstrates fair sitting balance with bilateral UE support intermittently leaning on therapist for support. Patient is able to perform sit<>stand x2 for ~5 seconds for repositioning. He is transitioned back to bed with Ax2. Current Level of Function Impacting Discharge (mobility/balance): Max Ax2 supine<>sit, Max Ax2 sit<>stand    Functional Outcome Measure: The patient scored 15/100 on the Barthel outcome measure.       Other factors to consider for discharge: medical stability, decreased balance, increased need for assistance, trach/vent dependent      Patient will benefit from skilled therapy intervention to address the above noted impairments. PLAN :  Recommendations and Planned Interventions: bed mobility training, transfer training, gait training, therapeutic exercises, neuromuscular re-education, edema management/control, patient and family training/education, and therapeutic activities      Frequency/Duration: Patient will be followed by physical therapy:  4 times a week to address goals. Recommendation for discharge: (in order for the patient to meet his/her long term goals)  To be determined: pending progress with acute care to [facility]    This discharge recommendation:  Has not yet been discussed the attending provider and/or case management    IF patient discharges home will need the following DME: to be determined (TBD)         SUBJECTIVE:   Patient stated OUCH.    OBJECTIVE DATA SUMMARY:   HISTORY:    No past medical history on file. No past surgical history on file. Personal factors and/or comorbidities impacting plan of care: please see above    Home Situation  Home Environment: Long term care([facility])    EXAMINATION/PRESENTATION/DECISION MAKING:   Critical Behavior:  Neurologic State: Alert  Orientation Level: Oriented X4  Cognition: Follows commands     Hearing:     Skin:    Edema: bilateral foot painful edema  Range Of Motion:  AROM: Generally decreased, functional           PROM: Generally decreased, functional           Strength:    Strength: Generally decreased, functional                    Tone & Sensation:   Tone: Normal              Sensation: Impaired               Coordination:  Coordination: Generally decreased, functional  Vision:      Functional Mobility:  Bed Mobility:     Supine to Sit: Maximum assistance;Assist x2; Moderate assistance  Sit to Supine: Maximum assistance;Assist x2; Moderate assistance  Scooting: Maximum assistance  Transfers:  Sit to Stand: Maximum assistance;Assist x2  Stand to Sit: Maximum assistance;Assist x2                       Balance:   Sitting: Impaired; With support  Sitting - Static: Fair (occasional)  Sitting - Dynamic: Not tested  Standing: Impaired; With support  Standing - Static: Fair;Constant support  Standing - Dynamic : Fair;Constant support  Ambulation/Gait Training:                                                         Stairs: Therapeutic Exercises:       Functional Measure:  Barthel Index:    Bathin  Bladder: 5  Bowels: 0  Groomin  Dressin  Feedin  Mobility: 0  Stairs: 0  Toilet Use: 0  Transfer (Bed to Chair and Back): 5  Total: 15/100       The Barthel ADL Index: Guidelines  1. The index should be used as a record of what a patient does, not as a record of what a patient could do. 2. The main aim is to establish degree of independence from any help, physical or verbal, however minor and for whatever reason. 3. The need for supervision renders the patient not independent. 4. A patient's performance should be established using the best available evidence. Asking the patient, friends/relatives and nurses are the usual sources, but direct observation and common sense are also important. However direct testing is not needed. 5. Usually the patient's performance over the preceding 24-48 hours is important, but occasionally longer periods will be relevant. 6. Middle categories imply that the patient supplies over 50 per cent of the effort. 7. Use of aids to be independent is allowed. Bertina Aschoff., Barthel, D.W. (7470). Functional evaluation: the Barthel Index. [address] [facility] (14)2. GUILHERME Shaw, Jane Soriano., Doris Ndiaye., Ora Hutchinson, [address] [place] [address] (). Measuring the change indisability after inpatient rehabilitation; comparison of the responsiveness of the Barthel Index and Functional Dugger Measure.  Journal of Neurology, Neurosurgery, and Psychiatry, 66(4), 004-340. CAROLYN Soriano, ALESHIA Stout, & Prashant Betancur M.A. (2004.) Assessment of post-stroke quality of life in cost-effectiveness studies: The usefulness of the Barthel Index and the EuroQoL-5D. Quality of Life Research, 15, 425-43            Physical Therapy Evaluation Charge Determination   History Examination Presentation Decision-Making   HIGH Complexity :3+ comorbidities / personal factors will impact the outcome/ POC  LOW Complexity : 1-2 Standardized tests and measures addressing body structure, function, activity limitation and / or participation in recreation  MEDIUM Complexity : Evolving with changing characteristics  Other outcome measures Barthel  HIGH       Based on the above components, the patient evaluation is determined to be of the following complexity level: HIGH     Pain Rating:      Activity Tolerance:   Fair and desaturates with exertion    After treatment patient left in no apparent distress:   Supine in bed, Call bell within reach, and Side rails x 3    COMMUNICATION/EDUCATION:   The patients plan of care was discussed with: Registered nurse. Fall prevention education was provided and the patient/caregiver indicated understanding., Patient/family have participated as able in goal setting and plan of care. , and Patient/family agree to work toward stated goals and plan of care.     Thank you for this referral.  Tom Sage, PT   Time Calculation: 20 mins

## 2021-01-09 NOTE — PROGRESS NOTES
Problem: Breathing Pattern - Ineffective  Goal: *Absence of hypoxia  Outcome: Progressing Towards Goal  Goal: *Use of effective breathing techniques  Outcome: Progressing Towards Goal  Goal: *PALLIATIVE CARE:  Alleviation of Dyspnea  Outcome: Progressing Towards Goal     Problem: Patient Education: Go to Patient Education Activity  Goal: Patient/Family Education  Outcome: Progressing Towards Goal     Problem: Airway Clearance - Ineffective  Goal: *Patent airway  Outcome: Progressing Towards Goal  Goal: *Absence of airway secretions  Outcome: Progressing Towards Goal  Goal: *Able to cough effectively  Outcome: Progressing Towards Goal  Goal: *PALLIATIVE CARE:  Alleviation of secretions, cough and/or nasal congestion  Outcome: Progressing Towards Goal     Problem: Patient Education: Go to Patient Education Activity  Goal: Patient/Family Education  Outcome: Progressing Towards Goal     Problem: Ventilator Management  Goal: *Adequate oxygenation and ventilation  Outcome: Progressing Towards Goal  Goal: *Patient maintains clear airway/free of aspiration  Outcome: Progressing Towards Goal  Goal: *Absence of infection signs and symptoms  Outcome: Progressing Towards Goal  Goal: *Normal spontaneous ventilation  Outcome: Progressing Towards Goal     Problem: Patient Education: Go to Patient Education Activity  Goal: Patient/Family Education  Outcome: Progressing Towards Goal     Problem: Pressure Injury - Risk of  Goal: *Prevention of pressure injury  Description: Document Rob Scale and appropriate interventions in the flowsheet. Outcome: Progressing Towards Goal  Note: Pressure Injury Interventions:  Sensory Interventions: Assess changes in LOC, Float heels, Minimize linen layers, Monitor skin under medical devices, Turn and reposition approx.  every two hours (pillows and wedges if needed)    Moisture Interventions: Absorbent underpads, Apply protective barrier, creams and emollients, Maintain skin hydration (lotion/cream), Minimize layers    Activity Interventions: Assess need for specialty bed, Pressure redistribution bed/mattress(bed type)    Mobility Interventions: Assess need for specialty bed, Float heels, Pressure redistribution bed/mattress (bed type), Turn and reposition approx. every two hours(pillow and wedges)    Nutrition Interventions: Document food/fluid/supplement intake    Friction and Shear Interventions: Apply protective barrier, creams and emollients, Lift sheet, Minimize layers                Problem: Patient Education: Go to Patient Education Activity  Goal: Patient/Family Education  Outcome: Progressing Towards Goal     Problem: Falls - Risk of  Goal: *Absence of Falls  Description: Document Elza Fall Risk and appropriate interventions in the flowsheet.   Outcome: Progressing Towards Goal  Note: Fall Risk Interventions:  Mobility Interventions: Communicate number of staff needed for ambulation/transfer    Mentation Interventions: Adequate sleep, hydration, pain control, Door open when patient unattended, Evaluate medications/consider consulting pharmacy    Medication Interventions: Evaluate medications/consider consulting pharmacy    Elimination Interventions: Call light in reach, Patient to call for help with toileting needs, Toileting schedule/hourly rounds    History of Falls Interventions: Consult care management for discharge planning, Door open when patient unattended, Evaluate medications/consider consulting pharmacy         Problem: Patient Education: Go to Patient Education Activity  Goal: Patient/Family Education  Outcome: Progressing Towards Goal     Problem: Nutrition Deficit  Goal: *Optimize nutritional status  Outcome: Progressing Towards Goal     Problem: Infection - Risk of, Central Venous Catheter-Associated Bloodstream Infection  Goal: *Absence of infection signs and symptoms  Outcome: Progressing Towards Goal     Problem: Infection - Risk of, Central Venous Catheter-Associated Bloodstream Infection  Goal: *Absence of infection signs and symptoms  Outcome: Progressing Towards Goal     Problem: Patient Education: Go to Patient Education Activity  Goal: Patient/Family Education  Outcome: Progressing Towards Goal

## 2021-01-09 NOTE — PROCEDURES
[facility]  [phone]    Vitals   Pre   Post   Assessment   Pre   Post     Temp  Temp: 97.8 °F (36.6 °C) (01/09/21 1520)  97.8 LOC  Alert and non-verbal. Patient communicates with pen and paper same   HR   Pulse (Heart Rate): 87 (01/09/21 1520) 89 Lungs   On vent via trach, lungs coarse  same   B/P   BP: 102/75 (01/09/21 1520) 103/66 Cardiac   Irregular rhythm  same   Resp   Resp Rate: 13 (01/09/21 1520) 19 Skin   Warm and dry    same   Pain level  Pain Intensity 1: 0 (01/09/21 1200) 0 Edema  +1 BLE same   Orders:  Reviewed   Duration:   Start:    1520 End:    1850 Total:   3.5hrs    Dialyzer:   Dialyzer/Set Up Inspection: Poli Amin (01/09/21 1520)   K Bath:   Dialysate K (mEq/L): 2 (01/09/21 1520)   Ca Bath:   Dialysate CA (mEq/L): 2.0 (01/09/21 1520)   Na/Bicarb:   Dialysate NA (mEq/L): 149 (01/09/21 1520)   Target Fluid Removal:   Goal/Amount of Fluid to Remove (mL): 1000 mL (01/09/21 1520)   Access     Type & Location:   Right  Subclavian CVC , no s/s of infection,Dressing changed, cvc patent blood flow, aspirated and flushed well, Each catheter limb disinfected per p&p, caps removed, hubs disinfected per p&p.        Labs  Reviewed    Obtained/Reviewed   Critical Results Called   Date when labs were drawn-  Hgb-    HGB   Date Value Ref Range Status   01/09/2021 7.1 (L) 12.1 - 17.0 g/dL Final     K-    Potassium   Date Value Ref Range Status   01/09/2021 3.5 3.5 - 5.1 mmol/L Final     Ca-   Calcium   Date Value Ref Range Status   01/09/2021 9.5 8.5 - 10.1 MG/DL Final     Bun-   BUN   Date Value Ref Range Status   01/09/2021 29 (H) 6 - 20 MG/DL Final     Creat-   Creatinine   Date Value Ref Range Status   01/09/2021 2.99 (H) 0.70 - 1.30 MG/DL Final     Comment:     INVESTIGATED PER DELTA CHECK PROTOCOL        Medications/ Blood Products Given     Name   Dose   Route and Time           Heparin 4000 units Cvc dwell        Blood Volume Processed (BVP):    79.2 Net Fluid Removed:  1000ml   Comments   Time Out Done: 1435  Primary Nurse Rpt Pre: Peter Mcclure RN  Primary Nurse Rpt Post:Valentina ROSAS RN  Pt Education:Procedural  Care Plan:Continue with HD care plan   Tx Summary:  1520-Labs, orders and medications were reviewed. SBAR given by primary nurse. HD was initiated using Right chest cvc.    1850-Hd was completed and all possible blood was returned. Pt removed 1kg of fluid with levophed support. Pt blood pressure began to decrease in the beginning of treatment despite being on a liner profile. Each dialysis catheter limb disinfected per p&p, blood returned per p&p, each dialysis hub disinfected per p&p, post dialysis catheter dwell instilled per order, and caps applied. Primary nurse was given a report.     Admiting Diagnosis: Hypotension   [facility]   Consent signed - Informed Consent Verified: Yes (01/09/21 1520)  Hepatitis Status- Negative Hep B AG 1/07/2021, Hep B AB Susceptible 01/07/2021  Machine #- Machine Number: [id number] (01/09/21 1520)  Telemetry status-Monitored remotely

## 2021-01-09 NOTE — PROGRESS NOTES
0730 received bedside report from [address] [place] [address]    0800 pt complaining of abdominal pain and nausea. TF held, MD notified. No gastric residual appreciated. Ondansetron administered. 0900 pt complaining of foot / toe pain. Wound care consulted    1400 MD notified right neck swelling. MD assessed. per MD: continue to monitor. No interventions needed     1500 iHD- pt on norepinephrine @ 2 mcg for marginal MAP (65) during iHD    1930 Bedside shift change report given to [address] [place] [address] (oncoming nurse) by Reed Smith RN (offgoing nurse).  Report included the following information SBAR, Kardex, Procedure Summary, Intake/Output, MAR, Recent Results and Cardiac Rhythm NSR with frequent PVCs

## 2021-01-09 NOTE — PROGRESS NOTES
[facility]    ICU TEAM Progress Note    Name: Jovanni Gonzalez   : 1950   MRN: [id number]   Date: 2021      I  Subjective:   Progress Note: 2021      Reason for ICU Admission: Hypotension, VAP, chronic respiratory failure, end-stage renal disease    Interval history: 72-year-old gentleman coming from [facility] acute [facility] [[facility]] on  due to hypotension on hemodialysis. Patient admitted and was started on treatment for ventilator associated pneumonia as he is chronically on a ventilator in the long-term facility and x-ray was concerning for right lower lobe infiltrate. He required to be on pressors but this is improving. Overnight Events:   No acute event overnight, transferred to CCU, off norepinephrine no fever. Active Problem List:     Problem List  Never Reviewed          Codes Class    Hypotension ICD-10-CM: I95.9  ICD-9-CM: 458.9               Past Medical History:      has no past medical history on file. Past Surgical History:      has no past surgical history on file. Home Medications:     Prior to Admission medications    Medication Sig Start Date End Date Taking? Authorizing Provider   ammonium lactate (LAC-HYDRIN) 12 % lotion Apply  to affected area two (2) times a day. rub in to affected area well   Yes Provider, Historical   famotidine (PEPCID) 20 mg tablet Take 20 mg by mouth daily. Yes Provider, Historical   heparin sodium,porcine (heparin, porcine,) 5,000 unit/mL injection 5,000 Units by SubCUTAneous route every twelve (12) hours. Yes Provider, Historical   lactobacillus-acidophilus (LACTINEX) 100 million cell grpk Take 1 Packet by mouth two (2) times a day. (Floranex)   Yes Provider, Historical   insulin regular (NOVOLIN R, HUMULIN R) 100 unit/mL injection by SubCUTAneous route. Sliding scale at [employer], Historical   guaiFENesin (ROBITUSSIN) 100 mg/5 mL liquid Take 200 mg by mouth every six (6) hours.    Yes Provider, Historical levothyroxine (Synthroid) 100 mcg tablet Take 100 mcg by mouth daily. Yes Provider, Historical   melatonin 3 mg tablet Take 3 mg by mouth nightly. Yes Provider, Historical   midodrine (PROAMATINE) 5 mg tablet Take 15 mg by mouth every eight (8) hours. Yes Provider, Historical   ondansetron (ZOFRAN) 2 mg/mL injection 4 mg by IntraVENous route every four (4) hours as needed for Nausea. Yes Provider, Historical   vancomycin HCl (VANCOMYCIN PO) Take 500 mg by mouth every six (6) hours. Indications: diarrhea from an infection with Clostridium difficile bacteria, C diff positive 20 at [address] [place] [address] 20  Yes Provider, Historical   acetaminophen (TYLENOL) 650 mg/20.3 mL solution Take 650 mg by mouth every six (6) hours as needed for Fever (pain). Yes Provider, Historical   albuterol (PROVENTIL VENTOLIN) 2.5 mg /3 mL (0.083 %) nebu 2.5 mg by Nebulization route three (3) times daily as needed for Wheezing, Shortness of Breath or Respiratory Distress. Yes Provider, Historical   albuterol-ipratropium (DUO-NEB) 2.5 mg-0.5 mg/3 ml nebu 3 mL by Nebulization route three (3) times daily as needed for Wheezing, Shortness of Breath or Respiratory Distress. Yes Provider, Historical   chlorhexidine (PERIDEX) 0.12 % solution 15 mL by Swish and Spit route three (3) times daily as needed (chronic ventilation - mouth swab). Yes Provider, Historical       Allergies/Social/Family History:     No Known Allergies   Social History     Tobacco Use    Smoking status: Not on file   Substance Use Topics    Alcohol use: Not on file      No family history on file.     Review of Systems:     Not able to obtain due to patient medical condition    Objective:   Vital Signs:  Visit Vitals  BP 94/60   Pulse 98   Temp 98.6 °F (37 °C)   Resp 27   Ht 6' (1.829 m)   Wt 75.6 kg (166 lb 10.7 oz)   SpO2 93%   BMI 22.60 kg/m²      O2 Device: Tracheostomy, Ventilator Temp (24hrs), Av.6 °F (37 °C), Min:98.5 °F (36.9 °C), Max:98.8 °F (37.1 °C)           Intake/Output:     Intake/Output Summary (Last 24 hours) at 1/9/2021 0649  Last data filed at 1/9/2021 0100  Gross per 24 hour   Intake 568.39 ml   Output    Net 568.39 ml       Physical Exam:    General:  Trach on vent, comfortable    Eyes:  Sclera anicteric. Pupils equal, round, reactive to light. Mouth/Throat:  Clear, no bleeding   Neck:  Tracheostomy   Lungs:    Good air entry, coarse crepitation bilaterally   Cardiovascular:  Regular rate and rhythm, no murmur, click, rub, or gallop. Abdomen:   Soft, non-tender, bowel sounds normal, non-distended. PEG tube   Extremities: No cyanosis or edema. Skin: No acute rash or lesions. Lymph Nodes: Cervical and supraclavicular normal.   Musculoskeletal:  No swelling or deformity. Lines/Devices:  Intact, no erythema, drainage, or tenderness. LABS AND  DATA: Personally reviewed  Recent Labs     01/09/21  0455 01/08/21  0923   WBC 14.1* 13.7*   HGB 7.1* 7.5*   HCT 22.2* 23.5*    248     Recent Labs     01/08/21  0422 01/06/21  2305    132*   K 4.0 4.1    98   CO2 24 30   BUN 26* 41*   CREA 2.18* 2.85*   GLU 66 69   CA 9.7 11.4*   MG  --  2.6*   PHOS  --  5.5*     No results for input(s): AP, TBIL, TP, ALB, GLOB, AML, LPSE in the last 72 hours. No lab exists for component: SGOT, GPT, AMYP  No results for input(s): INR, PTP, APTT, INREXT in the last 72 hours. No results for input(s): PHI, PCO2I, PO2I, FIO2I in the last 72 hours.   Recent Labs     01/06/21  2305   TROIQ <0.05       Hemodynamics:   PAP:   CO:     Wedge:   CI:     CVP:    SVR:       PVR:       Ventilator Settings:  Mode Rate Tidal Volume Pressure FiO2 PEEP   SIMV   500 ml  15 cm H2O 30 % 5 cm H20     Peak airway pressure: 20 cm H2O    Minute ventilation: 10 l/min        MEDS: Reviewed    Chest X-Ray:  CXR Results  (Last 48 hours)               01/08/21 0511  XR CHEST PORT Final result    Impression:  IMPRESSION: Decreased pulmonary edema bilaterally some residual residual   airspace opacities in the right lung. Narrative:  EXAM: XR CHEST PORT       INDICATION: pulmonary edema       COMPARISON: 1/7/2021       FINDINGS: A portable AP radiograph of the chest was obtained at 451 hours. Tubes   and lines are in stable position. . There is decreased pulmonary edema with   improved aeration bilaterally. Some residual airspace and interstitial opacities   in the right lung. Benedetta Ou Heart size is borderline. .  The bones and soft tissues are   grossly within normal limits. Assessment and Plan:   Chronic hypoxic respiratory failure and on chronic mechanical ventilation via tracheostomy:  Currently on SIMV, no recent increase in ventilatory requirement  Possible ventilator associated pneumonia: Leukocytosis and infiltrate, on chronic ventilator from outside facility. Continue vancomycin and cefepime. Follow-up on culture and sensitivity  End-stage renal disease: This is complicated with hypotension, been off Levophed since this morning, on midodrine 10 mg every 8. Plan for dialysis today. Appreciate nephrology    DVT and GI prophylaxis, ventilator bundle    DISPOSITION  Stay in ICU    CRITICAL CARE CONSULTANT NOTE  I had a face to face encounter with the patient, reviewed and interpreted patient data including clinical events, labs, images, vital signs, I/O's, and examined patient. I have discussed the case and the plan and management of the patient's care with the consulting services, the bedside nurses and the respiratory therapist.      NOTE OF PERSONAL INVOLVEMENT IN CARE   This patient has a high probability of imminent, clinically significant deterioration, which requires the highest level of preparedness to intervene urgently.  I participated in the decision-making and personally managed or directed the management of the following life and organ supporting interventions that required my frequent assessment to treat or prevent imminent deterioration. I personally spent 40 minutes of critical care time. This is time spent at this critically ill patient's bedside actively involved in patient care as well as the coordination of care and discussions with the patient's family. This does not include any procedural time which has been billed separately. Narayan King M.D.   Staff Intensivist/Pulmonologist  [facility]  1/9/2021

## 2021-01-09 NOTE — PROGRESS NOTES
Name: Juan Manuel Stevenson MRN: [id number]   : 1950 Hospital: [address]   Date: 2021        IMPRESSION:   · ESRD on HD. Patient was transferred from [facility] for management of accumulated edema and hypervolemia. Patient was not able to have adequate UF for the last 2 weeks. Patient had a successful HD/UF treatment yesterday with removal of 2.5 kg using variable Na Rx and albumin infusions. · Chronic hypervolemia with peripheral edema and pulmonary edema- improved after HD. CXR- improved aeration. 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 [facility] was notified. The UF goal will be decreased to 1 kg due to hypotension. Will use again linear Na modeling, albumin and low dialysate T. Midodrine before HD  · If patient is able to tolerate removal of another 7.6-7 kg without complications he may be stable to be sent back to [facility]. · The rest of management- as per primary team  · Start venofer and resume Epogen when the iron sats are above 20%     Subjective/Interval History:   I have reviewed the flowsheet and previous days notes. ROS:Review of systems not obtained due to patient factors. Objective:   Vital Signs:    Visit Vitals  BP 91/63   Pulse 94   Temp 98.5 °F (36.9 °C)   Resp 17   Ht 6' (1.829 m)   Wt 75.6 kg (166 lb 10.7 oz)   SpO2 91%   BMI 22.60 kg/m²       O2 Device: Tracheostomy       Temp (24hrs), Av.6 °F (37 °C), Min:98.5 °F (36.9 °C), Max:98.8 °F (37.1 °C)       Intake/Output:   Last shift:      No intake/output data recorded. Last 3 shifts:  1901 -  0700  In: 2004.8 [I.V.:1479.8]  Out: -     Intake/Output Summary (Last 24 hours) at 2021 1058  Last data filed at 2021 0600  Gross per 24 hour   Intake 614.21 ml   Output    Net 614.21 ml        Physical Exam:  General:    Alert, cooperative, no distress, appears stated age. On vent. Denies symptoms.  Watching TV  Head:   Normocephalic, without obvious abnormality, atraumatic. Eyes:   Conjunctivae/corneas clear. Corrective glasses are on  Nose:  Nares normal. No drainage or sinus tenderness. Throat:    Lips, mucosa, and tongue normal.    Neck:  Trach in.  Lungs:   Decreased to auscultation bilaterally. Few Rhonchi. No rales. Chest wall:  No tenderness or deformity. No Accessory muscle use. Heart:   Regular rate and rhythm,  no murmur, rub or gallop. Abdomen:   Soft, non-tender. Not distended. Bowel sounds normal. No masses  Extremities: Extremities normal, atraumatic, No cyanosis. No edema. No clubbing  Skin:     Texture, turgor normal. No rashes or lesions. Not Jaundiced  Psych:  calm. Not depressed. Not anxious or agitated. DATA:  Labs:  Recent Labs     01/09/21 0455 01/08/21  0422 01/06/21  2305    139 132*   K 3.5 4.0 4.1    104 98   CO2 27 24 30   BUN 29* 26* 41*   CREA 2.99* 2.18* 2.85*   CA 9.5 9.7 11.4*   PHOS  --   --  5.5*   MG  --   --  2.6*     Recent Labs     01/09/21  0455 01/08/21  0923 01/06/21  2305   WBC 14.1* 13.7* 14.8*   HGB 7.1* 7.5* 8.1*   HCT 22.2* 23.5* 25.9*    248 260     No results for input(s): TETE, KU, CLU, CREAU in the last 72 hours.     No lab exists for component: PROU    Total time spent with patient:  35 minutes    [] Critical Care Provided    Care Plan discussed with:   Staff, Medical Team    Bay Finn MD

## 2021-01-10 LAB
ANION GAP SERPL CALC-SCNC: 6 MMOL/L (ref 5–15)
BACTERIA SPEC CULT: NORMAL
BUN SERPL-MCNC: 15 MG/DL (ref 6–20)
BUN/CREAT SERPL: 7 (ref 12–20)
CALCIUM SERPL-MCNC: 8.8 MG/DL (ref 8.5–10.1)
CHLORIDE SERPL-SCNC: 104 MMOL/L (ref 97–108)
CO2 SERPL-SCNC: 28 MMOL/L (ref 21–32)
CREAT SERPL-MCNC: 2.16 MG/DL (ref 0.7–1.3)
ERYTHROCYTE [DISTWIDTH] IN BLOOD BY AUTOMATED COUNT: 17.2 % (ref 11.5–14.5)
GLUCOSE SERPL-MCNC: 57 MG/DL (ref 65–100)
GRAM STN SPEC: NORMAL
GRAM STN SPEC: NORMAL
HCT VFR BLD AUTO: 23.1 % (ref 36.6–50.3)
HGB BLD-MCNC: 7.4 G/DL (ref 12.1–17)
MCH RBC QN AUTO: 28 PG (ref 26–34)
MCHC RBC AUTO-ENTMCNC: 32 G/DL (ref 30–36.5)
MCV RBC AUTO: 87.5 FL (ref 80–99)
NRBC # BLD: 0.45 K/UL (ref 0–0.01)
NRBC BLD-RTO: 4 PER 100 WBC
PLATELET # BLD AUTO: 292 K/UL (ref 150–400)
PMV BLD AUTO: 10.6 FL (ref 8.9–12.9)
POTASSIUM SERPL-SCNC: 3.1 MMOL/L (ref 3.5–5.1)
RBC # BLD AUTO: 2.64 M/UL (ref 4.1–5.7)
SERVICE CMNT-IMP: NORMAL
SODIUM SERPL-SCNC: 138 MMOL/L (ref 136–145)
WBC # BLD AUTO: 11.4 K/UL (ref 4.1–11.1)

## 2021-01-10 PROCEDURE — 36415 COLL VENOUS BLD VENIPUNCTURE: CPT

## 2021-01-10 PROCEDURE — 85027 COMPLETE CBC AUTOMATED: CPT

## 2021-01-10 PROCEDURE — 74011250637 HC RX REV CODE- 250/637: Performed by: INTERNAL MEDICINE

## 2021-01-10 PROCEDURE — 74011250636 HC RX REV CODE- 250/636: Performed by: NURSE PRACTITIONER

## 2021-01-10 PROCEDURE — 74011000250 HC RX REV CODE- 250: Performed by: ANESTHESIOLOGY

## 2021-01-10 PROCEDURE — 94003 VENT MGMT INPAT SUBQ DAY: CPT

## 2021-01-10 PROCEDURE — 80048 BASIC METABOLIC PNL TOTAL CA: CPT

## 2021-01-10 PROCEDURE — 74011250637 HC RX REV CODE- 250/637: Performed by: NURSE PRACTITIONER

## 2021-01-10 PROCEDURE — 94640 AIRWAY INHALATION TREATMENT: CPT

## 2021-01-10 PROCEDURE — 74011000258 HC RX REV CODE- 258: Performed by: NURSE PRACTITIONER

## 2021-01-10 PROCEDURE — 97535 SELF CARE MNGMENT TRAINING: CPT

## 2021-01-10 PROCEDURE — 65620000000 HC RM CCU GENERAL

## 2021-01-10 PROCEDURE — 97165 OT EVAL LOW COMPLEX 30 MIN: CPT

## 2021-01-10 PROCEDURE — 74011250636 HC RX REV CODE- 250/636: Performed by: INTERNAL MEDICINE

## 2021-01-10 RX ADMIN — IPRATROPIUM BROMIDE AND ALBUTEROL SULFATE 3 ML: .5; 3 SOLUTION RESPIRATORY (INHALATION) at 20:15

## 2021-01-10 RX ADMIN — IPRATROPIUM BROMIDE AND ALBUTEROL SULFATE 3 ML: .5; 3 SOLUTION RESPIRATORY (INHALATION) at 07:57

## 2021-01-10 RX ADMIN — CHLORHEXIDINE GLUCONATE 15 ML: 0.12 RINSE ORAL at 09:07

## 2021-01-10 RX ADMIN — Medication: at 09:07

## 2021-01-10 RX ADMIN — IPRATROPIUM BROMIDE AND ALBUTEROL SULFATE 3 ML: .5; 3 SOLUTION RESPIRATORY (INHALATION) at 13:35

## 2021-01-10 RX ADMIN — Medication 10 ML: at 09:07

## 2021-01-10 RX ADMIN — PIPERACILLIN AND TAZOBACTAM 3.38 G: 3; .375 INJECTION, POWDER, LYOPHILIZED, FOR SOLUTION INTRAVENOUS at 01:19

## 2021-01-10 RX ADMIN — Medication 10 ML: at 23:08

## 2021-01-10 RX ADMIN — HEPARIN SODIUM 5000 UNITS: 5000 INJECTION INTRAVENOUS; SUBCUTANEOUS at 04:15

## 2021-01-10 RX ADMIN — HEPARIN SODIUM 5000 UNITS: 5000 INJECTION INTRAVENOUS; SUBCUTANEOUS at 17:03

## 2021-01-10 RX ADMIN — FENTANYL CITRATE 50 MCG: 50 INJECTION, SOLUTION INTRAMUSCULAR; INTRAVENOUS at 13:19

## 2021-01-10 RX ADMIN — PIPERACILLIN AND TAZOBACTAM 3.38 G: 3; .375 INJECTION, POWDER, LYOPHILIZED, FOR SOLUTION INTRAVENOUS at 12:59

## 2021-01-10 RX ADMIN — MIDODRINE HYDROCHLORIDE 10 MG: 5 TABLET ORAL at 22:40

## 2021-01-10 RX ADMIN — ACETAMINOPHEN 650 MG: 325 TABLET ORAL at 13:06

## 2021-01-10 RX ADMIN — MIDODRINE HYDROCHLORIDE 10 MG: 5 TABLET ORAL at 13:06

## 2021-01-10 RX ADMIN — FAMOTIDINE 20 MG: 20 TABLET, FILM COATED ORAL at 09:10

## 2021-01-10 RX ADMIN — MIDODRINE HYDROCHLORIDE 10 MG: 5 TABLET ORAL at 05:47

## 2021-01-10 RX ADMIN — CHLORHEXIDINE GLUCONATE 15 ML: 0.12 RINSE ORAL at 22:00

## 2021-01-10 RX ADMIN — IPRATROPIUM BROMIDE AND ALBUTEROL SULFATE 3 ML: .5; 3 SOLUTION RESPIRATORY (INHALATION) at 02:55

## 2021-01-10 RX ADMIN — Medication 10 ML: at 13:00

## 2021-01-10 RX ADMIN — Medication 3 MG: at 22:40

## 2021-01-10 NOTE — PROGRESS NOTES
Problem: Breathing Pattern - Ineffective  Goal: *Absence of hypoxia  Outcome: Progressing Towards Goal  Goal: *Use of effective breathing techniques  Outcome: Progressing Towards Goal  Goal: *PALLIATIVE CARE:  Alleviation of Dyspnea  Outcome: Progressing Towards Goal     Problem: Patient Education: Go to Patient Education Activity  Goal: Patient/Family Education  Outcome: Progressing Towards Goal     Problem: Airway Clearance - Ineffective  Goal: *Patent airway  Outcome: Progressing Towards Goal  Goal: *Absence of airway secretions  Outcome: Progressing Towards Goal  Goal: *Able to cough effectively  Outcome: Progressing Towards Goal  Goal: *PALLIATIVE CARE:  Alleviation of secretions, cough and/or nasal congestion  Outcome: Progressing Towards Goal     Problem: Patient Education: Go to Patient Education Activity  Goal: Patient/Family Education  Outcome: Progressing Towards Goal     Problem: Ventilator Management  Goal: *Adequate oxygenation and ventilation  Outcome: Progressing Towards Goal  Goal: *Patient maintains clear airway/free of aspiration  Outcome: Progressing Towards Goal  Goal: *Absence of infection signs and symptoms  Outcome: Progressing Towards Goal  Goal: *Normal spontaneous ventilation  Outcome: Progressing Towards Goal     Problem: Patient Education: Go to Patient Education Activity  Goal: Patient/Family Education  Outcome: Progressing Towards Goal     Problem: Pressure Injury - Risk of  Goal: *Prevention of pressure injury  Description: Document Rob Scale and appropriate interventions in the flowsheet.   Outcome: Progressing Towards Goal  Note: Pressure Injury Interventions:  Sensory Interventions: Assess changes in LOC, Avoid rigorous massage over bony prominences, Float heels, Keep linens dry and wrinkle-free, Minimize linen layers    Moisture Interventions: Absorbent underpads, Apply protective barrier, creams and emollients, Maintain skin hydration (lotion/cream), Minimize layers    Activity Interventions: Assess need for specialty bed, Increase time out of bed, Pressure redistribution bed/mattress(bed type), PT/OT evaluation    Mobility Interventions: Assess need for specialty bed, Float heels, Pressure redistribution bed/mattress (bed type), PT/OT evaluation    Nutrition Interventions: Document food/fluid/supplement intake    Friction and Shear Interventions: Apply protective barrier, creams and emollients, Lift sheet, Minimize layers                Problem: Patient Education: Go to Patient Education Activity  Goal: Patient/Family Education  Outcome: Progressing Towards Goal     Problem: Falls - Risk of  Goal: *Absence of Falls  Description: Document Aimee Mosqueda Fall Risk and appropriate interventions in the flowsheet.   Outcome: Progressing Towards Goal     Problem: Patient Education: Go to Patient Education Activity  Goal: Patient/Family Education  Outcome: Progressing Towards Goal     Problem: Nutrition Deficit  Goal: *Optimize nutritional status  Outcome: Progressing Towards Goal     Problem: Infection - Risk of, Central Venous Catheter-Associated Bloodstream Infection  Goal: *Absence of infection signs and symptoms  Outcome: Progressing Towards Goal     Problem: Patient Education: Go to Patient Education Activity  Goal: Patient/Family Education  Outcome: Progressing Towards Goal     Problem: Patient Education: Go to Patient Education Activity  Goal: Patient/Family Education  Outcome: Progressing Towards Goal

## 2021-01-10 NOTE — PROGRESS NOTES
Name: Otto Pan MRN: [id number]   : 1950 Hospital: [address]   Date: 1/10/2021        IMPRESSION:   · ESRD on HD. Patient was transferred from [facility] for management of accumulated edema and hypervolemia. Patient was not able to have adequate UF for the last 2 weeks. Patient had a successful HD/UF treatment yesterday with removal of 1.0 kg using variable Na Rx and albumin infusions. · Chronic hypervolemia with peripheral edema and pulmonary edema- improved after HD. CXR- improved aeration. · Chronic respiratory failure- on vent  · Hypotension, chronic. Just weaned off levophed. 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 The UF goal will be decreased to 1 kg due to hypotension. Will use again linear Na modeling, albumin and low dialysate T. Midodrine before HD  · If patient is able to tolerate removal of another 5.1-3 kg without complications he may be stable to be sent back to [facility]. · The rest of management- as per primary team  · Start venofer and resume Epogen when the iron sats are above 20%     Subjective/Interval History:   I have reviewed the flowsheet and previous days notes. ROS:Review of systems not obtained due to patient factors. Objective:   Vital Signs:    Visit Vitals  /70   Pulse 94   Temp 98.6 °F (37 °C)   Resp 16   Ht 6' (1.829 m)   Wt 75.4 kg (166 lb 3.6 oz)   SpO2 94%   BMI 22.54 kg/m²       O2 Device: Tracheostomy, Ventilator       Temp (24hrs), Av.1 °F (36.7 °C), Min:97.6 °F (36.4 °C), Max:98.9 °F (37.2 °C)       Intake/Output:   Last shift:      No intake/output data recorded.   Last 3 shifts:  1901 - 01/10 0700  In: 843.8 [I.V.:368.8]  Out: 1000     Intake/Output Summary (Last 24 hours) at 1/10/2021 1024  Last data filed at 1/10/2021 0400  Gross per 24 hour   Intake 643.94 ml   Output 1000 ml   Net -356.06 ml        Physical Exam:  General:    Alert, cooperative, no distress, appears stated age.  On vent. Denies symptoms. Watching TV  Head:   Normocephalic, without obvious abnormality, atraumatic. Eyes:   Conjunctivae/corneas clear. Corrective glasses are on  Nose:  Nares normal. No drainage or sinus tenderness. Throat:    Lips, mucosa, and tongue normal.    Neck:  Trach in.  Lungs:   Decreased to auscultation bilaterally. Few Rhonchi. No rales. Chest wall:  No tenderness or deformity. No Accessory muscle use. Heart:   Regular rate and rhythm,  no murmur, rub or gallop. Abdomen:   Soft, non-tender. Not distended. Bowel sounds normal. No masses  Extremities: Extremities normal, atraumatic, No cyanosis. No edema. No clubbing  Skin:     Texture, turgor normal. No rashes or lesions. Not Jaundiced  Psych:  calm. Not depressed. Not anxious or agitated. DATA:  Labs:  Recent Labs     01/10/21  0322 01/09/21  0455 01/08/21  0422    138 139   K 3.1* 3.5 4.0    103 104   CO2 28 27 24   BUN 15 29* 26*   CREA 2.16* 2.99* 2.18*   CA 8.8 9.5 9.7     Recent Labs     01/10/21  0322 01/09/21  0455 01/08/21  0923   WBC 11.4* 14.1* 13.7*   HGB 7.4* 7.1* 7.5*   HCT 23.1* 22.2* 23.5*    295 248     No results for input(s): TETE, KU, CLU, CREAU in the last 72 hours.     No lab exists for component: PROU    Total time spent with patient:  35 minutes    [] Critical Care Provided    Care Plan discussed with:   Staff, Medical Team    Tania Nicholson MD
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
## 2021-01-10 NOTE — PROGRESS NOTES
[facility]    ICU TEAM Progress Note    Name: Bernadette Brown   : 1950   MRN: [id number]   Date: 1/10/2021      I  Subjective:   Progress Note: 1/10/2021      Reason for ICU Admission:   Hypotension, VAP, chronic respiratory failure, end-stage renal disease  Interval history:  71-year-old gentleman coming from long-term acute care facility [[facility]] on  due to hypotension on hemodialysis. Patient admitted and was started on treatment for ventilator associated pneumonia as he is chronically on a ventilator in the long-term facility and x-ray was concerning for right lower lobe infiltrate. He required to be on pressors but this is improving. Overnight Events:   No acute event, tolerated hemodialysis off pressors, at baseline mental status, no change in vent setting. Active Problem List:     Problem List  Never Reviewed          Codes Class    Hypotension ICD-10-CM: I95.9  ICD-9-CM: 458.9               Past Medical History:      has no past medical history on file. Past Surgical History:      has no past surgical history on file. Home Medications:     Prior to Admission medications    Medication Sig Start Date End Date Taking? Authorizing Provider   ammonium lactate (LAC-HYDRIN) 12 % lotion Apply  to affected area two (2) times a day. rub in to affected area well   Yes Provider, Historical   famotidine (PEPCID) 20 mg tablet Take 20 mg by mouth daily. Yes Provider, Historical   heparin sodium,porcine (heparin, porcine,) 5,000 unit/mL injection 5,000 Units by SubCUTAneous route every twelve (12) hours. Yes Provider, Historical   lactobacillus-acidophilus (LACTINEX) 100 million cell grpk Take 1 Packet by mouth two (2) times a day. (Floranex)   Yes Provider, Historical   insulin regular (NOVOLIN R, HUMULIN R) 100 unit/mL injection by SubCUTAneous route.  Sliding scale at [employer], Historical   guaiFENesin (ROBITUSSIN) 100 mg/5 mL liquid Take 200 mg by mouth every six (6) hours. Yes Provider, Historical   levothyroxine (Synthroid) 100 mcg tablet Take 100 mcg by mouth daily. Yes Provider, Historical   melatonin 3 mg tablet Take 3 mg by mouth nightly. Yes Provider, Historical   midodrine (PROAMATINE) 5 mg tablet Take 15 mg by mouth every eight (8) hours. Yes Provider, Historical   ondansetron (ZOFRAN) 2 mg/mL injection 4 mg by IntraVENous route every four (4) hours as needed for Nausea. Yes Provider, Historical   vancomycin HCl (VANCOMYCIN PO) Take 500 mg by mouth every six (6) hours. Indications: diarrhea from an infection with Clostridium difficile bacteria, C diff positive 12/28/20 at [facility] 12/28/20  Yes Provider, Historical   acetaminophen (TYLENOL) 650 mg/20.3 mL solution Take 650 mg by mouth every six (6) hours as needed for Fever (pain). Yes Provider, Historical   albuterol (PROVENTIL VENTOLIN) 2.5 mg /3 mL (0.083 %) nebu 2.5 mg by Nebulization route three (3) times daily as needed for Wheezing, Shortness of Breath or Respiratory Distress. Yes Provider, Historical   albuterol-ipratropium (DUO-NEB) 2.5 mg-0.5 mg/3 ml nebu 3 mL by Nebulization route three (3) times daily as needed for Wheezing, Shortness of Breath or Respiratory Distress. Yes Provider, Historical   chlorhexidine (PERIDEX) 0.12 % solution 15 mL by Swish and Spit route three (3) times daily as needed (chronic ventilation - mouth swab). Yes Provider, Historical       Allergies/Social/Family History:     No Known Allergies   Social History     Tobacco Use    Smoking status: Not on file   Substance Use Topics    Alcohol use: Not on file      No family history on file.     Review of Systems:     10 system reviewed and are negative but as in interval history, history is limited as he is on trach, ventilator    Objective:   Vital Signs:  Visit Vitals  /70   Pulse 94   Temp 98.6 °F (37 °C)   Resp 16   Ht 6' (1.829 m)   Wt 75.4 kg (166 lb 3.6 oz)   SpO2 94%   BMI 22.54 kg/m²      O2 Device: Tracheostomy, Ventilator Temp (24hrs), Av.1 °F (36.7 °C), Min:97.6 °F (36.4 °C), Max:98.9 °F (37.2 °C)           Intake/Output:     Intake/Output Summary (Last 24 hours) at 1/10/2021 1100  Last data filed at 1/10/2021 0400  Gross per 24 hour   Intake 643.94 ml   Output 1000 ml   Net -356.06 ml       Physical Exam:    General:  Trach on vent, comfortable    Eyes:  Sclera anicteric. Pupils equal, round, reactive to light. Mouth/Throat:  Clear, no bleeding   Neck:  Tracheostomy   Lungs:    Good air entry, coarse crepitation bilaterally   Cardiovascular:  Regular rate and rhythm, no murmur, click, rub, or gallop. Abdomen:   Soft, non-tender, bowel sounds normal, non-distended. PEG tube   Extremities: No cyanosis or edema. Skin: No acute rash or lesions. Lymph Nodes: Cervical and supraclavicular normal.   Musculoskeletal:  No swelling or deformity. Lines/Devices:  Intact, no erythema, drainage, or tenderness.          LABS AND  DATA: Personally reviewed  Recent Labs     01/10/21  0322 21  0455   WBC 11.4* 14.1*   HGB 7.4* 7.1*   HCT 23.1* 22.2*    295     Recent Labs     01/10/21  0322 21  0455    138   K 3.1* 3.5    103   CO2 28 27   BUN 15 29*   CREA 2.16* 2.99*   GLU 57* 74   CA 8.8 9.5     No results for input(s): AP, TBIL, TP, ALB, GLOB, AML, LPSE in the last 72 hours. No lab exists for component: SGOT, GPT, AMYP  No results for input(s): INR, PTP, APTT, INREXT in the last 72 hours. No results for input(s): PHI, PCO2I, PO2I, FIO2I in the last 72 hours. No results for input(s): CPK, CKMB, TROIQ, BNPP in the last 72 hours.     Hemodynamics:   PAP:   CO:     Wedge:   CI:     CVP:    SVR:       PVR:       Ventilator Settings:  Mode Rate Tidal Volume Pressure FiO2 PEEP   (P) Assist control   (P) 500 ml  (P) 15 cm H2O 30 % (P) 5 cm H20     Peak airway pressure: (P) 21 cm H2O    Minute ventilation: (P) 9.96 l/min        MEDS: Reviewed    Chest X-Ray:  CXR Results  (Last 48 hours)               01/09/21 0504  XR CHEST PORT Final result    Impression:  IMPRESSION:   No significant interval change. Narrative:  EXAM: XR CHEST PORT       HISTORY: Follow-up. COMPARISON: 1/8/2021       FINDINGS: Portable AP. A tracheostomy tube, left IJ central venous catheter, and   right IJ gas catheter are unchanged. Cardiac monitoring leads overlie the chest.   The heart is on the upper limits of normal for size, but unchanged. There is   unchanged mild edema. There is a mild right pleural effusion tracking along the   right lateral chest wall. There is chronic deformity of several left-sided ribs. Assessment and Plan:   Chronic hypoxic respiratory failure and on chronic mechanical ventilation via tracheostomy:  Currently on SIMV, no recent increase in ventilatory requirement, had multiple left-sided rib fracture and I believe this is the main underlying etiology for his rapid shallow breathing. Possible ventilator associated pneumonia: Leukocytosis and infiltrate, on chronic ventilator from outside facility. Culture negative, discontinue vancomycin today. It is reasonable to continue on Zosyn for total of 7 days   End-stage renal disease: This is complicated with hypotension, been off Levophed since this morning, on midodrine 10 mg every 8. Tolerated hemodialysis off pressors on January 9. Appreciate nephrology     DVT and GI prophylaxis, ventilator bundle    DISPOSITION  Will need to transfer back to LTAC when possible    CRITICAL CARE CONSULTANT NOTE  I had a face to face encounter with the patient, reviewed and interpreted patient data including clinical events, labs, images, vital signs, I/O's, and examined patient.   I have discussed the case and the plan and management of the patient's care with the consulting services, the bedside nurses and the respiratory therapist.      NOTE OF PERSONAL INVOLVEMENT IN CARE   This patient has a high probability of imminent, clinically significant deterioration, which requires the highest level of preparedness to intervene urgently. I participated in the decision-making and personally managed or directed the management of the following life and organ supporting interventions that required my frequent assessment to treat or prevent imminent deterioration. I personally spent 40 minutes of critical care time. This is time spent at this critically ill patient's bedside actively involved in patient care as well as the coordination of care and discussions with the patient's family. This does not include any procedural time which has been billed separately. Narayan King M.D.   Staff Intensivist/Pulmonologist  [facility]  1/10/2021

## 2021-01-10 NOTE — PROGRESS NOTES
Orders received, chart reviewed and patient evaluated by occupational therapy. Pending progression with skilled acute occupational therapy, recommend: return to [place] when medically stable. Full evaluation to follow.

## 2021-01-10 NOTE — PROGRESS NOTES
0800:  Bedside and Verbal shift change report given to NINFA GILL (oncoming nurse) by NINFA ROTHMAN (offgoing nurse). Report included the following information SBAR, Kardex, Intake/Output, MAR, Recent Results and Cardiac Rhythm NSR with PVCs. 0900:  Patient is A&O x 4, no complaints of pain at this time. Mouth care performed and patient repositioned. Small loose green stool. Patient is receiving tube feeds Nepro at 25mL/hr with a 30mL water flush Q4 hours. May be increased to 50mL/hr.      1319:  50mcg fentanyl given for pain 7/10. Patient was given tylenol, but complained of his legs, toes, arms and abdomen hurting. 1419:  Patient's pain is better, now a 3/10.    1600:  Bedside and Verbal shift change report given to NINFA WHITE (oncoming nurse) by Tiarra Staton RN (offgoing nurse). Report included the following information SBAR, Kardex, Intake/Output, MAR, Recent Results and Cardiac Rhythm NSR with PVCs.

## 2021-01-11 LAB
ANION GAP SERPL CALC-SCNC: 8 MMOL/L (ref 5–15)
BUN SERPL-MCNC: 22 MG/DL (ref 6–20)
BUN/CREAT SERPL: 7 (ref 12–20)
CALCIUM SERPL-MCNC: 9.2 MG/DL (ref 8.5–10.1)
CHLORIDE SERPL-SCNC: 106 MMOL/L (ref 97–108)
CO2 SERPL-SCNC: 28 MMOL/L (ref 21–32)
CREAT SERPL-MCNC: 3.01 MG/DL (ref 0.7–1.3)
ERYTHROCYTE [DISTWIDTH] IN BLOOD BY AUTOMATED COUNT: 17.1 % (ref 11.5–14.5)
GLUCOSE SERPL-MCNC: 92 MG/DL (ref 65–100)
HCT VFR BLD AUTO: 24.2 % (ref 36.6–50.3)
HGB BLD-MCNC: 7.8 G/DL (ref 12.1–17)
MCH RBC QN AUTO: 28.3 PG (ref 26–34)
MCHC RBC AUTO-ENTMCNC: 32.2 G/DL (ref 30–36.5)
MCV RBC AUTO: 87.7 FL (ref 80–99)
NRBC # BLD: 0.5 K/UL (ref 0–0.01)
NRBC BLD-RTO: 3.6 PER 100 WBC
PLATELET # BLD AUTO: 322 K/UL (ref 150–400)
PMV BLD AUTO: 10.7 FL (ref 8.9–12.9)
POTASSIUM SERPL-SCNC: 3.2 MMOL/L (ref 3.5–5.1)
RBC # BLD AUTO: 2.76 M/UL (ref 4.1–5.7)
SODIUM SERPL-SCNC: 142 MMOL/L (ref 136–145)
WBC # BLD AUTO: 13.7 K/UL (ref 4.1–11.1)

## 2021-01-11 PROCEDURE — 97530 THERAPEUTIC ACTIVITIES: CPT

## 2021-01-11 PROCEDURE — 74011250636 HC RX REV CODE- 250/636: Performed by: INTERNAL MEDICINE

## 2021-01-11 PROCEDURE — 74011250637 HC RX REV CODE- 250/637: Performed by: INTERNAL MEDICINE

## 2021-01-11 PROCEDURE — 36415 COLL VENOUS BLD VENIPUNCTURE: CPT

## 2021-01-11 PROCEDURE — 77030018861

## 2021-01-11 PROCEDURE — 77030018831 HC SOL IRR H20 BAXT -A

## 2021-01-11 PROCEDURE — 74011250637 HC RX REV CODE- 250/637: Performed by: NURSE PRACTITIONER

## 2021-01-11 PROCEDURE — 94003 VENT MGMT INPAT SUBQ DAY: CPT

## 2021-01-11 PROCEDURE — 80048 BASIC METABOLIC PNL TOTAL CA: CPT

## 2021-01-11 PROCEDURE — 74011250636 HC RX REV CODE- 250/636: Performed by: NURSE PRACTITIONER

## 2021-01-11 PROCEDURE — 74011000258 HC RX REV CODE- 258: Performed by: NURSE PRACTITIONER

## 2021-01-11 PROCEDURE — 94640 AIRWAY INHALATION TREATMENT: CPT

## 2021-01-11 PROCEDURE — 77030006998

## 2021-01-11 PROCEDURE — 85027 COMPLETE CBC AUTOMATED: CPT

## 2021-01-11 PROCEDURE — 77030018798 HC PMP KT ENTRL FED COVD -A

## 2021-01-11 PROCEDURE — 74011000250 HC RX REV CODE- 250: Performed by: ANESTHESIOLOGY

## 2021-01-11 PROCEDURE — 65620000000 HC RM CCU GENERAL

## 2021-01-11 RX ADMIN — FAMOTIDINE 20 MG: 20 TABLET, FILM COATED ORAL at 08:35

## 2021-01-11 RX ADMIN — IPRATROPIUM BROMIDE AND ALBUTEROL SULFATE 3 ML: .5; 3 SOLUTION RESPIRATORY (INHALATION) at 22:41

## 2021-01-11 RX ADMIN — MIDODRINE HYDROCHLORIDE 10 MG: 5 TABLET ORAL at 06:28

## 2021-01-11 RX ADMIN — Medication 3 MG: at 21:23

## 2021-01-11 RX ADMIN — HEPARIN SODIUM 5000 UNITS: 5000 INJECTION INTRAVENOUS; SUBCUTANEOUS at 04:45

## 2021-01-11 RX ADMIN — FENTANYL CITRATE 50 MCG: 50 INJECTION, SOLUTION INTRAMUSCULAR; INTRAVENOUS at 15:00

## 2021-01-11 RX ADMIN — PIPERACILLIN AND TAZOBACTAM 3.38 G: 3; .375 INJECTION, POWDER, LYOPHILIZED, FOR SOLUTION INTRAVENOUS at 12:51

## 2021-01-11 RX ADMIN — FENTANYL CITRATE 50 MCG: 50 INJECTION, SOLUTION INTRAMUSCULAR; INTRAVENOUS at 22:53

## 2021-01-11 RX ADMIN — Medication 40 ML: at 21:23

## 2021-01-11 RX ADMIN — CHLORHEXIDINE GLUCONATE 15 ML: 0.12 RINSE ORAL at 08:41

## 2021-01-11 RX ADMIN — Medication: at 08:34

## 2021-01-11 RX ADMIN — MIDODRINE HYDROCHLORIDE 10 MG: 5 TABLET ORAL at 21:23

## 2021-01-11 RX ADMIN — IPRATROPIUM BROMIDE AND ALBUTEROL SULFATE 3 ML: .5; 3 SOLUTION RESPIRATORY (INHALATION) at 13:43

## 2021-01-11 RX ADMIN — MIDODRINE HYDROCHLORIDE 10 MG: 5 TABLET ORAL at 14:54

## 2021-01-11 RX ADMIN — PIPERACILLIN AND TAZOBACTAM 3.38 G: 3; .375 INJECTION, POWDER, LYOPHILIZED, FOR SOLUTION INTRAVENOUS at 01:05

## 2021-01-11 RX ADMIN — Medication 10 ML: at 06:27

## 2021-01-11 RX ADMIN — IPRATROPIUM BROMIDE AND ALBUTEROL SULFATE 3 ML: .5; 3 SOLUTION RESPIRATORY (INHALATION) at 08:22

## 2021-01-11 RX ADMIN — IPRATROPIUM BROMIDE AND ALBUTEROL SULFATE 3 ML: .5; 3 SOLUTION RESPIRATORY (INHALATION) at 01:00

## 2021-01-11 RX ADMIN — Medication: at 18:01

## 2021-01-11 RX ADMIN — HEPARIN SODIUM 5000 UNITS: 5000 INJECTION INTRAVENOUS; SUBCUTANEOUS at 15:01

## 2021-01-11 RX ADMIN — Medication 10 ML: at 18:01

## 2021-01-11 RX ADMIN — CHLORHEXIDINE GLUCONATE 15 ML: 0.12 RINSE ORAL at 21:24

## 2021-01-11 NOTE — PROGRESS NOTES
Transitions of Care Plan:   RUR: 13%    Patient from [address] [place] [address] - 1/6/21; hypotensive during HD   Disposition - return to [address] [place] [address]; medically stable for transfer today    0930 - Placed call to [address] [place] [address] liaison re patient and transition of care return to [address] [place] [address]. Requested callback. Confirmed referral sent to [facility] via "[employer] Set up [facility] Will Call for today - ALS with Vent. CM confirmed with [facility] vent is through trach. 1045 - Received call and spoke with [facility] liaison. CM provided update that patient is medically stable for discharge and return to facility. Liaison inquired if patient was on both vent support and HD dependent. CM spoke with liaison that patient presented to [facility] on 1/6/21 due to hypotension while during HD session at [address] [place] [address] and remains on vent as he was on at [address] [place] [address]. Liaison to speak with [facility] and admissions regarding acceptance and bed availability. Willard 9 call to patient's wife. Agreeable to transition of care back to [facility]. CM will continue to follow.     Jesus Kruger, MPH

## 2021-01-11 NOTE — PROGRESS NOTES
Name: Ayaz Rashid MRN: [id number]   : 1950 Hospital: [address]   Date: 2021        IMPRESSION:   · ESRD on HD. Patient was transferred from [facility] for management of accumulated edema and hypervolemia. Patient was not able to have adequate UF for the last 2 weeks. Patient had a successful HD/UF treatment yesterday with removal of 1.0 kg using variable Na Rx and albumin infusions. Patient is on TTS schedule. · Chronic hypervolemia with peripheral edema and pulmonary edema- improved after HD. CXR- improved aeration. · Chronic respiratory failure- on vent. Weaning attempts are in progress. · Hypotension, chronic. On midodrine. 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 The UF goal will be decreased to 1 kg due to hypotension. Will use again linear Na modeling, albumin and low dialysate T. Midodrine before HD  · If patient is able to tolerate removal of another 2.3-5 kg without complications he may be stable to be sent back to [facility]. · The rest of management- as per primary team  · Start venofer and resume Epogen when the iron sats are above 20%     Subjective/Interval History:   I have reviewed the flowsheet and previous days notes. ROS:Review of systems not obtained due to patient factors.     Objective:   Vital Signs:    Visit Vitals  /68   Pulse 86   Temp 98.8 °F (37.1 °C)   Resp (!) 5   Ht 6' (1.829 m)   Wt 76 kg (167 lb 8.8 oz)   SpO2 93%   BMI 22.72 kg/m²       O2 Device: Tracheostomy, Ventilator       Temp (24hrs), Av °F (36.7 °C), Min:97.4 °F (36.3 °C), Max:98.8 °F (37.1 °C)       Intake/Output:   Last shift:      701 - 1900  In: 130   Out: -   Last 3 shifts: 1901 -  07  In: 2016.2 [I.V.:316.2]  Out: 150 [Urine:150]    Intake/Output Summary (Last 24 hours) at 2021 1226  Last data filed at 2021 0800  Gross per 24 hour   Intake 1450 ml   Output 150 ml   Net 1300 ml        Physical Exam:  General:    Alert, cooperative, no distress, appears stated age. On vent. Head:   Normocephalic, without obvious abnormality, atraumatic. Eyes:   Conjunctivae/corneas clear. Corrective glasses are on  Nose:  Nares normal. No drainage or sinus tenderness. Throat:    Lips, mucosa, and tongue normal.    Neck:  Trach in.  Lungs:   Decreased to auscultation bilaterally. Few Rhonchi. No rales. Chest wall:  No tenderness or deformity. No Accessory muscle use. Heart:   Regular rate and rhythm,  no murmur, rub or gallop. Abdomen:   Soft, non-tender. Not distended. Bowel sounds normal. No masses  Extremities: Extremities normal, atraumatic, No cyanosis. No edema. No clubbing  Skin:     Texture, turgor normal. No rashes or lesions. Not Jaundiced  Psych:  calm. Not depressed. Not anxious or agitated. DATA:  Labs:  Recent Labs     01/11/21  0438 01/10/21  0322 01/09/21  0455    138 138   K 3.2* 3.1* 3.5    104 103   CO2 28 28 27   BUN 22* 15 29*   CREA 3.01* 2.16* 2.99*   CA 9.2 8.8 9.5     Recent Labs     01/11/21  0438 01/10/21  0322 01/09/21  0455   WBC 13.7* 11.4* 14.1*   HGB 7.8* 7.4* 7.1*   HCT 24.2* 23.1* 22.2*    292 295     No results for input(s): TETE, KU, CLU, CREAU in the last 72 hours.     No lab exists for component: PROU    Total time spent with patient:  35 minutes    [] Critical Care Provided    Care Plan discussed with:   Staff, Medical Team    Fermín Hawkins MD

## 2021-01-11 NOTE — PROGRESS NOTES
Problem: Breathing Pattern - Ineffective  Goal: *Absence of hypoxia  Outcome: Progressing Towards Goal  Goal: *Use of effective breathing techniques  Outcome: Progressing Towards Goal  Goal: *PALLIATIVE CARE:  Alleviation of Dyspnea  Outcome: Progressing Towards Goal     Problem: Patient Education: Go to Patient Education Activity  Goal: Patient/Family Education  Outcome: Progressing Towards Goal     Problem: Airway Clearance - Ineffective  Goal: *Patent airway  Outcome: Progressing Towards Goal  Goal: *Absence of airway secretions  Outcome: Progressing Towards Goal  Goal: *Able to cough effectively  Outcome: Progressing Towards Goal  Goal: *PALLIATIVE CARE:  Alleviation of secretions, cough and/or nasal congestion  Outcome: Progressing Towards Goal     Problem: Patient Education: Go to Patient Education Activity  Goal: Patient/Family Education  Outcome: Progressing Towards Goal     Problem: Ventilator Management  Goal: *Adequate oxygenation and ventilation  Outcome: Progressing Towards Goal  Goal: *Patient maintains clear airway/free of aspiration  Outcome: Progressing Towards Goal  Goal: *Absence of infection signs and symptoms  Outcome: Progressing Towards Goal  Goal: *Normal spontaneous ventilation  Outcome: Progressing Towards Goal     Problem: Patient Education: Go to Patient Education Activity  Goal: Patient/Family Education  Outcome: Progressing Towards Goal     Problem: Pressure Injury - Risk of  Goal: *Prevention of pressure injury  Description: Document Rob Scale and appropriate interventions in the flowsheet. Outcome: Progressing Towards Goal  Note: Pressure Injury Interventions:  Sensory Interventions: Avoid rigorous massage over bony prominences, Float heels, Keep linens dry and wrinkle-free, Minimize linen layers, Turn and reposition approx.  every two hours (pillows and wedges if needed)    Moisture Interventions: Absorbent underpads, Apply protective barrier, creams and emollients, Minimize layers    Activity Interventions: Assess need for specialty bed, Pressure redistribution bed/mattress(bed type)    Mobility Interventions: Float heels, Pressure redistribution bed/mattress (bed type), Turn and reposition approx. every two hours(pillow and wedges)    Nutrition Interventions: Document food/fluid/supplement intake    Friction and Shear Interventions: Lift sheet, Minimize layers                Problem: Patient Education: Go to Patient Education Activity  Goal: Patient/Family Education  Outcome: Progressing Towards Goal     Problem: Falls - Risk of  Goal: *Absence of Falls  Description: Document Elza Fall Risk and appropriate interventions in the flowsheet.   Outcome: Progressing Towards Goal  Note: Fall Risk Interventions:  Mobility Interventions: Communicate number of staff needed for ambulation/transfer    Mentation Interventions: Adequate sleep, hydration, pain control, Door open when patient unattended, Evaluate medications/consider consulting pharmacy, More frequent rounding    Medication Interventions: Evaluate medications/consider consulting pharmacy, Patient to call before getting OOB    Elimination Interventions: Call light in reach, Patient to call for help with toileting needs, Toileting schedule/hourly rounds    History of Falls Interventions: Consult care management for discharge planning, Door open when patient unattended, Evaluate medications/consider consulting pharmacy         Problem: Patient Education: Go to Patient Education Activity  Goal: Patient/Family Education  Outcome: Progressing Towards Goal     Problem: Nutrition Deficit  Goal: *Optimize nutritional status  Outcome: Progressing Towards Goal     Problem: Infection - Risk of, Central Venous Catheter-Associated Bloodstream Infection  Goal: *Absence of infection signs and symptoms  Outcome: Progressing Towards Goal     Problem: Patient Education: Go to Patient Education Activity  Goal: Patient/Family Education  Outcome: Progressing Towards Goal     Problem: Patient Education: Go to Patient Education Activity  Goal: Patient/Family Education  Outcome: Progressing Towards Goal     Problem: Patient Education: Go to Patient Education Activity  Goal: Patient/Family Education  Outcome: Progressing Towards Goal

## 2021-01-11 NOTE — PROGRESS NOTES
[facility]    ICU TEAM Progress Note    Name: Geo Moeller   : 1950   MRN: [id number]   Date: 2021      I  Subjective:   Progress Note: 2021      Reason for ICU Admission: Coming from [place] [address], hypotension VAP, chronic respiratory failure, end-stage renal disease on hemodialysis    Interval history:  52-year-old gentleman coming from [facility] care [facility] [[facility] on  due to hypotension on hemodialysis.  Patient admitted and was started on treatment for ventilator associated pneumonia as he is chronically on a ventilator in the long-term facility and x-ray was concerning for right lower lobe infiltrate.  He required to be on pressors but this is improving. Overnight Events:   No acute event overnight, vent settings changed to [facility] to add more comfort and support of the patient. No further hypotension no nausea no vomiting no change in mental status no fever    Active Problem List:     Problem List  Never Reviewed          Codes Class    Hypotension ICD-10-CM: I95.9  ICD-9-CM: 458.9               Past Medical History:      has no past medical history on file. Past Surgical History:      has no past surgical history on file. Home Medications:     Prior to Admission medications    Medication Sig Start Date End Date Taking? Authorizing Provider   ammonium lactate (LAC-HYDRIN) 12 % lotion Apply  to affected area two (2) times a day. rub in to affected area well   Yes Provider, Historical   famotidine (PEPCID) 20 mg tablet Take 20 mg by mouth daily. Yes Provider, Historical   heparin sodium,porcine (heparin, porcine,) 5,000 unit/mL injection 5,000 Units by SubCUTAneous route every twelve (12) hours. Yes Provider, Historical   lactobacillus-acidophilus (LACTINEX) 100 million cell grpk Take 1 Packet by mouth two (2) times a day. (Floranex)   Yes Provider, Historical   insulin regular (NOVOLIN R, HUMULIN R) 100 unit/mL injection by SubCUTAneous route.  Sliding scale at [facility]   Yes Provider, Historical   guaiFENesin (ROBITUSSIN) 100 mg/5 mL liquid Take 200 mg by mouth every six (6) hours. Yes Provider, Historical   levothyroxine (Synthroid) 100 mcg tablet Take 100 mcg by mouth daily. Yes Provider, Historical   melatonin 3 mg tablet Take 3 mg by mouth nightly. Yes Provider, Historical   midodrine (PROAMATINE) 5 mg tablet Take 15 mg by mouth every eight (8) hours. Yes Provider, Historical   ondansetron (ZOFRAN) 2 mg/mL injection 4 mg by IntraVENous route every four (4) hours as needed for Nausea. Yes Provider, Historical   vancomycin HCl (VANCOMYCIN PO) Take 500 mg by mouth every six (6) hours. Indications: diarrhea from an infection with Clostridium difficile bacteria, C diff positive 12/28/20 at [address] [place] [address] 12/28/20  Yes Provider, Historical   acetaminophen (TYLENOL) 650 mg/20.3 mL solution Take 650 mg by mouth every six (6) hours as needed for Fever (pain). Yes Provider, Historical   albuterol (PROVENTIL VENTOLIN) 2.5 mg /3 mL (0.083 %) nebu 2.5 mg by Nebulization route three (3) times daily as needed for Wheezing, Shortness of Breath or Respiratory Distress. Yes Provider, Historical   albuterol-ipratropium (DUO-NEB) 2.5 mg-0.5 mg/3 ml nebu 3 mL by Nebulization route three (3) times daily as needed for Wheezing, Shortness of Breath or Respiratory Distress. Yes Provider, Historical   chlorhexidine (PERIDEX) 0.12 % solution 15 mL by Swish and Spit route three (3) times daily as needed (chronic ventilation - mouth swab). Yes Provider, Historical       Allergies/Social/Family History:     No Known Allergies   Social History     Tobacco Use    Smoking status: Not on file   Substance Use Topics    Alcohol use: Not on file      No family history on file.     Review of Systems:      10 system reviewed and they are negative but as in interval history, history is limited as patient on the ventilator via tracheostomy    Objective:   Vital Signs:  Visit Vitals  /75   Pulse 86   Temp 98.8 °F (37.1 °C)   Resp 15   Ht 6' (1.829 m)   Wt 76 kg (167 lb 8.8 oz)   SpO2 94%   BMI 22.72 kg/m²      O2 Device: Tracheostomy, Ventilator Temp (24hrs), Av.2 °F (36.8 °C), Min:97.4 °F (36.3 °C), Max:99.1 °F (37.3 °C)           Intake/Output:     Intake/Output Summary (Last 24 hours) at 2021 0908  Last data filed at 2021 0800  Gross per 24 hour   Intake 1680 ml   Output 150 ml   Net 1530 ml       Physical Exam:    General:  Trach on vent, comfortable    Eyes:  Sclera anicteric. Pupils equal, round, reactive to light. Mouth/Throat:  Clear, no bleeding   Neck:  Tracheostomy   Lungs:    Good air entry, coarse crepitation bilaterally   Cardiovascular:  Regular rate and rhythm, no murmur, click, rub, or gallop. Abdomen:   Soft, non-tender, bowel sounds normal, non-distended.  PEG tube   Extremities: No cyanosis or edema. Skin: No acute rash or lesions. Lymph Nodes: Cervical and supraclavicular normal.   Musculoskeletal:  No swelling or deformity. Lines/Devices:  Intact, no erythema, drainage, or tenderness.          LABS AND  DATA: Personally reviewed  Recent Labs     218 01/10/21  032   WBC 13.7* 11.4*   HGB 7.8* 7.4*   HCT 24.2* 23.1*    292     Recent Labs     218 01/10/21  0322    138   K 3.2* 3.1*    104   CO2 28 28   BUN 22* 15   CREA 3.01* 2.16*   GLU 92 57*   CA 9.2 8.8     No results for input(s): AP, TBIL, TP, ALB, GLOB, AML, LPSE in the last 72 hours. No lab exists for component: SGOT, GPT, AMYP  No results for input(s): INR, PTP, APTT, INREXT in the last 72 hours. No results for input(s): PHI, PCO2I, PO2I, FIO2I in the last 72 hours. No results for input(s): CPK, CKMB, TROIQ, BNPP in the last 72 hours.     Hemodynamics:   PAP:   CO:     Wedge:   CI:     CVP:    SVR:       PVR:       Ventilator Settings:  Mode Rate Tidal Volume Pressure FiO2 PEEP   Volume control(changed by MD)   500 ml  20 cm H2O 30 % 5 cm H20     Peak airway pressure: 32 cm H2O    Minute ventilation: 7.68 l/min        MEDS: Reviewed    Chest X-Ray:  CXR Results  (Last 48 hours)    None            Assessment and Plan:   Chronic hypoxic respiratory failure and on chronic mechanical ventilation via tracheostomy:  Currently on assist control mode, no recent increase in ventilatory requirement, had multiple left-sided rib fracture and I believe this is the main underlying etiology for his rapid shallow breathing. Possible ventilator associated pneumonia: Leukocytosis and infiltrate, on chronic ventilator from outside facility.    Culture negative, discontinue vancomycin today. It is reasonable to continue on Zosyn for total of 7 days, last day will be January 12  End-stage renal disease: This is complicated with hypotension, been off Levophed since this morning, on midodrine 10 mg every 8.    Tolerated hemodialysis off pressors . Appreciate nephrology     DVT and GI prophylaxis, ventilator bundle    DISPOSITION  Transfer back to LTAC when possible    CRITICAL CARE CONSULTANT NOTE  I had a face to face encounter with the patient, reviewed and interpreted patient data including clinical events, labs, images, vital signs, I/O's, and examined patient. I have discussed the case and the plan and management of the patient's care with the consulting services, the bedside nurses and the respiratory therapist.      NOTE OF PERSONAL INVOLVEMENT IN CARE   This patient has a high probability of imminent, clinically significant deterioration, which requires the highest level of preparedness to intervene urgently. I participated in the decision-making and personally managed or directed the management of the following life and organ supporting interventions that required my frequent assessment to treat or prevent imminent deterioration. I personally spent 40 minutes of critical care time.   This is time spent at this critically ill patient's bedside actively involved in patient care as well as the coordination of care and discussions with the patient's family. This does not include any procedural time which has been billed separately. Judah Verdin M.D.   Staff Intensivist/Pulmonologist  [facility]  1/11/2021

## 2021-01-11 NOTE — PROGRESS NOTES
Problem: Mobility Impaired (Adult and Pediatric)  Goal: *Acute Goals and Plan of Care (Insert Text)  Description: FUNCTIONAL STATUS PRIOR TO ADMISSION: The patient has a trach is a difficult to understand. He was receiving therapy at [facility] prior to admission. HOME SUPPORT PRIOR TO ADMISSION: The patient came from [facility]    Physical Therapy Goals  Initiated 1/9/2021  1. Patient will move from supine to sit and sit to supine  in bed with moderate assistance of 2 within 7 day(s). 2.  Patient will transfer from bed to chair and chair to bed with maximal assistance of 2 using the least restrictive device within 7 day(s). 3.  Patient will perform sit to stand with maximal assistance within 7 day(s). Outcome: Not Met   PHYSICAL THERAPY TREATMENT  Patient: Mari Raza (29 y.o. male)  Date: 1/11/2021  Diagnosis: Hypotension [I95.9] <principal problem not specified>       Precautions: Fall  Chart, physical therapy assessment, plan of care and goals were reviewed. ASSESSMENT  Patient continues with skilled PT services and is slowly progressing towards goals. Received patient supine in bed with right side off loaded, just finished using bed pan, on vent support. Patient non verbal, able to communicate with gestures, mouthing words, and writing. Patient indicates having difficulty breathing, improves when raising HOB (~40 degs). Worked on bed mobility and sitting EOB. Requires max assist x2 for supine to sit and to maintain sitting balance today. Only able to achieve partial upright sitting for a brief period of time and required max assist for support. Pt appeared panicked f/b request to return to bed. Once back, he endorsed having difficulty breathing when sitting up. RN arrived into the room, assisted with deep suctioning. Assisted patient with position change in the bed and adjusted bed linens for comfort.   Patient does well directing his care/ needs with difficulty d/t being non verbal. Patient remained supine with HOB elevated 30 degs, resumed feeding, right side and LE's off loaded with pillows. Current Level of Function Impacting Discharge (mobility/balance): Max assist x2    Other factors to consider for discharge: from [facility] :  Patient continues to benefit from skilled intervention to address the above impairments. Continue treatment per established plan of care. to address goals. Recommendation for discharge: (in order for the patient to meet his/her long term goals)  Return to [facility]    This discharge recommendation:  Has been made in collaboration with the attending provider and/or case management    IF patient discharges home will need the following DME: none       SUBJECTIVE:   Non verbal, communicates with gestures, mouthing words, and writing. Indicated he wanted to attempt sitting up EOB    OBJECTIVE DATA SUMMARY:   Critical Behavior:  Neurologic State: Alert  Orientation Level: Oriented X4  Cognition: Appropriate decision making, Appropriate for age attention/concentration, Appropriate safety awareness, Follows commands  Safety/Judgement: (did not assess)  Functional Mobility Training:  Bed Mobility:  Rolling: Maximum assistance  Supine to Sit: Maximum assistance;Assist x2  Sit to Supine: Maximum assistance;Assist x2           Transfers:                                   Balance:  Sitting: Impaired; With support  Sitting - Static: Fair (occasional); Supported sitting  Ambulation/Gait Training:                                            Pain Rating:  Indicates pain bilateral feet - worsens with touch    Activity Tolerance:   Poor and requires rest breaks    After treatment patient left in no apparent distress:   Supine in bed, Heels elevated for pressure relief, Call bell within reach and HOB 30 degs    COMMUNICATION/COLLABORATION:   The patients plan of care was discussed with: Occupational therapist and Registered nurse.      Halima Wooten, PT   Time Calculation: 37 mins
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
## 2021-01-11 NOTE — WOUND CARE
WOCN Note:     New consult placed for right great toe assessment. Assessed in room 4225. PPE: face shield, mask and gloves    Chart reviewed. Admitted DX:  Hypotension    Assessment:   Patient is A&O x alert, trached and requires assist with repositioning. Bed: in touch malorie isolibrium gel   Patient reports no pain. Heels intact without erythema  Generalized edema. 1. POA Right great toe:  Scattered areas of non blanching red to the tip of toe; toes nails are long. Left open to air. No treatment required. Wound, Pressure Prevention & Skin Care Recommendations:    1. Minimize layers of linen/pads under patient to optimize support surface. 2.  Turn/reposition approximately every 2 hours and offload heels. 3.  Manage moisture/ Keep skin folds clean and dry. 4.  Specialty bed: in touch malorie    Discussed above with Nirmala OCASIO. Transition of Care: Plan to sign off, reconsult if needed.     ROMIE LeoN RN [facility]  Available on [employer]  Pager [phone]  Office [phone]

## 2021-01-11 NOTE — PROGRESS NOTES
0730 Bedside and Verbal shift change report given to Nirmala RN (oncoming nurse) by Bia Gutiérrez RN (offgoing nurse). Report included the following information SBAR, Kardex, Procedure Summary, Intake/Output, MAR, Recent Results and Cardiac Rhythm NSR w/ PVCs. 1400 PT/OT at bedside   1800 Trach care completed & inner cannula changed - pt tolerated well  1930 Bedside and Verbal shift change report given to Roni Bravo RN (oncoming nurse) by Sola Tran RN (offgoing nurse).  Report included the following information SBAR, Kardex, Procedure Summary, Intake/Output, MAR, Recent Results and Cardiac Rhythm NSR w/ PCVs.

## 2021-01-11 NOTE — PROGRESS NOTES
1930 received bedside report from [address] [place] [address] pt alert and oriented on assessment, VSS, wife at bedside    0000 no change on reassessment    0430 labs sent per order    0730 Bedside shift change report given to Nirmala RN (oncoming nurse) by Ramon Magaña RN (offgoing nurse). Report included the following information SBAR, Kardex, Procedure Summary, Intake/Output, MAR, Recent Results and Cardiac Rhythm NSR.

## 2021-01-12 LAB
ANION GAP SERPL CALC-SCNC: 6 MMOL/L (ref 5–15)
BACTERIA SPEC CULT: NORMAL
BUN SERPL-MCNC: 28 MG/DL (ref 6–20)
BUN/CREAT SERPL: 8 (ref 12–20)
CALCIUM SERPL-MCNC: 9.6 MG/DL (ref 8.5–10.1)
CHLORIDE SERPL-SCNC: 105 MMOL/L (ref 97–108)
CO2 SERPL-SCNC: 29 MMOL/L (ref 21–32)
CREAT SERPL-MCNC: 3.59 MG/DL (ref 0.7–1.3)
ERYTHROCYTE [DISTWIDTH] IN BLOOD BY AUTOMATED COUNT: 17.5 % (ref 11.5–14.5)
GLUCOSE SERPL-MCNC: 103 MG/DL (ref 65–100)
HCT VFR BLD AUTO: 24 % (ref 36.6–50.3)
HGB BLD-MCNC: 7.8 G/DL (ref 12.1–17)
MCH RBC QN AUTO: 28.5 PG (ref 26–34)
MCHC RBC AUTO-ENTMCNC: 32.5 G/DL (ref 30–36.5)
MCV RBC AUTO: 87.6 FL (ref 80–99)
NRBC # BLD: 0.38 K/UL (ref 0–0.01)
NRBC BLD-RTO: 2.7 PER 100 WBC
PLATELET # BLD AUTO: 361 K/UL (ref 150–400)
PMV BLD AUTO: 10.8 FL (ref 8.9–12.9)
POTASSIUM SERPL-SCNC: 3.1 MMOL/L (ref 3.5–5.1)
RBC # BLD AUTO: 2.74 M/UL (ref 4.1–5.7)
SERVICE CMNT-IMP: NORMAL
SODIUM SERPL-SCNC: 140 MMOL/L (ref 136–145)
WBC # BLD AUTO: 14.3 K/UL (ref 4.1–11.1)

## 2021-01-12 PROCEDURE — 80048 BASIC METABOLIC PNL TOTAL CA: CPT

## 2021-01-12 PROCEDURE — 94003 VENT MGMT INPAT SUBQ DAY: CPT

## 2021-01-12 PROCEDURE — 97530 THERAPEUTIC ACTIVITIES: CPT

## 2021-01-12 PROCEDURE — 74011250637 HC RX REV CODE- 250/637: Performed by: INTERNAL MEDICINE

## 2021-01-12 PROCEDURE — 74011250636 HC RX REV CODE- 250/636: Performed by: INTERNAL MEDICINE

## 2021-01-12 PROCEDURE — 65620000000 HC RM CCU GENERAL

## 2021-01-12 PROCEDURE — 94640 AIRWAY INHALATION TREATMENT: CPT

## 2021-01-12 PROCEDURE — 74011250637 HC RX REV CODE- 250/637: Performed by: NURSE PRACTITIONER

## 2021-01-12 PROCEDURE — 74011250636 HC RX REV CODE- 250/636: Performed by: NURSE PRACTITIONER

## 2021-01-12 PROCEDURE — 90935 HEMODIALYSIS ONE EVALUATION: CPT

## 2021-01-12 PROCEDURE — 74011000258 HC RX REV CODE- 258: Performed by: NURSE PRACTITIONER

## 2021-01-12 PROCEDURE — P9047 ALBUMIN (HUMAN), 25%, 50ML: HCPCS | Performed by: INTERNAL MEDICINE

## 2021-01-12 PROCEDURE — 85027 COMPLETE CBC AUTOMATED: CPT

## 2021-01-12 PROCEDURE — 97116 GAIT TRAINING THERAPY: CPT

## 2021-01-12 PROCEDURE — 74011000250 HC RX REV CODE- 250: Performed by: ANESTHESIOLOGY

## 2021-01-12 PROCEDURE — 36415 COLL VENOUS BLD VENIPUNCTURE: CPT

## 2021-01-12 RX ORDER — SODIUM CHLORIDE 9 MG/ML
5 INJECTION, SOLUTION INTRAVENOUS CONTINUOUS
Status: DISCONTINUED | OUTPATIENT
Start: 2021-01-12 | End: 2021-01-14 | Stop reason: HOSPADM

## 2021-01-12 RX ADMIN — HEPARIN SODIUM 2000 UNITS: 1000 INJECTION INTRAVENOUS; SUBCUTANEOUS at 14:19

## 2021-01-12 RX ADMIN — SODIUM CHLORIDE 5 ML/HR: 9 INJECTION, SOLUTION INTRAVENOUS at 02:25

## 2021-01-12 RX ADMIN — HEPARIN SODIUM 2000 UNITS: 1000 INJECTION INTRAVENOUS; SUBCUTANEOUS at 14:18

## 2021-01-12 RX ADMIN — IPRATROPIUM BROMIDE AND ALBUTEROL SULFATE 3 ML: .5; 3 SOLUTION RESPIRATORY (INHALATION) at 20:10

## 2021-01-12 RX ADMIN — SODIUM CHLORIDE 5 ML/HR: 9 INJECTION, SOLUTION INTRAVENOUS at 22:54

## 2021-01-12 RX ADMIN — HEPARIN SODIUM 5000 UNITS: 5000 INJECTION INTRAVENOUS; SUBCUTANEOUS at 04:05

## 2021-01-12 RX ADMIN — IPRATROPIUM BROMIDE AND ALBUTEROL SULFATE 3 ML: .5; 3 SOLUTION RESPIRATORY (INHALATION) at 08:05

## 2021-01-12 RX ADMIN — Medication 3 MG: at 22:54

## 2021-01-12 RX ADMIN — MIDODRINE HYDROCHLORIDE 10 MG: 5 TABLET ORAL at 22:55

## 2021-01-12 RX ADMIN — ONDANSETRON 4 MG: 2 INJECTION INTRAMUSCULAR; INTRAVENOUS at 09:30

## 2021-01-12 RX ADMIN — Medication 10 ML: at 13:36

## 2021-01-12 RX ADMIN — Medication 30 ML: at 05:09

## 2021-01-12 RX ADMIN — CHLORHEXIDINE GLUCONATE 15 ML: 0.12 RINSE ORAL at 08:53

## 2021-01-12 RX ADMIN — FENTANYL CITRATE 50 MCG: 50 INJECTION, SOLUTION INTRAMUSCULAR; INTRAVENOUS at 23:01

## 2021-01-12 RX ADMIN — PIPERACILLIN AND TAZOBACTAM 3.38 G: 3; .375 INJECTION, POWDER, LYOPHILIZED, FOR SOLUTION INTRAVENOUS at 17:20

## 2021-01-12 RX ADMIN — ALBUMIN (HUMAN) 12.5 G: 0.25 INJECTION, SOLUTION INTRAVENOUS at 14:13

## 2021-01-12 RX ADMIN — CHLORHEXIDINE GLUCONATE 15 ML: 0.12 RINSE ORAL at 22:55

## 2021-01-12 RX ADMIN — HEPARIN SODIUM 5000 UNITS: 5000 INJECTION INTRAVENOUS; SUBCUTANEOUS at 15:53

## 2021-01-12 RX ADMIN — Medication: at 17:33

## 2021-01-12 RX ADMIN — FAMOTIDINE 20 MG: 20 TABLET, FILM COATED ORAL at 08:52

## 2021-01-12 RX ADMIN — Medication 20 ML: at 22:55

## 2021-01-12 RX ADMIN — PIPERACILLIN AND TAZOBACTAM 3.38 G: 3; .375 INJECTION, POWDER, LYOPHILIZED, FOR SOLUTION INTRAVENOUS at 00:23

## 2021-01-12 RX ADMIN — MIDODRINE HYDROCHLORIDE 10 MG: 5 TABLET ORAL at 05:08

## 2021-01-12 RX ADMIN — MIDODRINE HYDROCHLORIDE 10 MG: 5 TABLET ORAL at 13:36

## 2021-01-12 RX ADMIN — Medication: at 08:52

## 2021-01-12 RX ADMIN — IPRATROPIUM BROMIDE AND ALBUTEROL SULFATE 3 ML: .5; 3 SOLUTION RESPIRATORY (INHALATION) at 01:29

## 2021-01-12 NOTE — PROGRESS NOTES
Name: Afua Yanez MRN: [id number]   : 1950 Hospital: . [address]   Date: 2021        IMPRESSION:   · ESRD on HD. Patient was transferred from [facility] for management of accumulated edema and hypervolemia. Patient was not able to have adequate UF for the last 2 weeks. Patient had a successful HD/UF treatment yesterday with removal of 1.0 kg using variable Na Rx and albumin infusions. Patient is on TTS schedule. · Chronic hypervolemia with peripheral edema and pulmonary edema- improved after HD. CXR- improved aeration. · Chronic respiratory failure- on vent. Weaning attempts are in progress. · Hypotension, chronic. On midodrine. 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 The UF goal will be increased to 1.5 kg if able to tolerate. ·  Will use again linear Na modeling, albumin and low dialysate T. Midodrine before HD  · If patient is able to tolerate removal of another 5.1-2 kg without complications he may be stable to be sent back to [facility]. · The rest of management- as per primary team  · Start venofer and resume Epogen when the iron sats are above 20%     Subjective/Interval History:   I have reviewed the flowsheet and previous days notes. ROS:Review of systems not obtained due to patient factors.     Objective:   Vital Signs:    Visit Vitals  /68   Pulse 97   Temp 97.8 °F (36.6 °C)   Resp 20   Ht 6' (1.829 m)   Wt 76.4 kg (168 lb 6.9 oz)   SpO2 93%   BMI 22.84 kg/m²       O2 Device: Tracheostomy       Temp (24hrs), Av.3 °F (36.8 °C), Min:97.8 °F (36.6 °C), Max:98.8 °F (37.1 °C)       Intake/Output:   Last shift:      701 - 1900  In: 135 [I.V.:5]  Out: -   Last 3 shifts: 01/10 1901 -  0700  In: 3920 [I.V.:360]  Out: 150 [Urine:150]    Intake/Output Summary (Last 24 hours) at 2021 0934  Last data filed at 2021 0800  Gross per 24 hour   Intake 1615 ml   Output    Net 1615 ml        Physical Exam:  General:    Alert, cooperative, no distress, appears stated age. On vent. Head:   Normocephalic, without obvious abnormality, atraumatic. Eyes:   Conjunctivae/corneas clear. Corrective glasses are on  Nose:  Nares normal. No drainage or sinus tenderness. Throat:    Lips, mucosa, and tongue normal.    Neck:  Trach in. + secretions. Lungs:   Decreased to auscultation bilaterally. Few Rhonchi. No rales. Chest wall:  No tenderness or deformity. No Accessory muscle use. Heart:   Regular rate and rhythm,  no murmur, rub or gallop. Abdomen:   Soft, non-tender. Not distended. Bowel sounds normal. No masses  Extremities: Extremities normal, atraumatic, No cyanosis. No edema. No clubbing  Skin:     Texture, turgor normal. No rashes or lesions. Not Jaundiced  Psych:  calm. Not depressed. Not anxious or agitated. DATA:  Labs:  Recent Labs     01/12/21  0226 01/11/21  0438 01/10/21  0322    142 138   K 3.1* 3.2* 3.1*    106 104   CO2 29 28 28   BUN 28* 22* 15   CREA 3.59* 3.01* 2.16*   CA 9.6 9.2 8.8     Recent Labs     01/12/21  0226 01/11/21  0438 01/10/21  0322   WBC 14.3* 13.7* 11.4*   HGB 7.8* 7.8* 7.4*   HCT 24.0* 24.2* 23.1*    322 292     No results for input(s): TETE, KU, CLU, CREAU in the last 72 hours.     No lab exists for component: PROU    Total time spent with patient:  35 minutes    [] Critical Care Provided    Care Plan discussed with:   Staff, Medical Team    Ana Galvan MD

## 2021-01-12 NOTE — PROGRESS NOTES
Physical Therapy - defer  Chart reviewed. Spoke with OT who reports patient is currently receiving bedside dialysis. Will follow.

## 2021-01-12 NOTE — PROGRESS NOTES
[facility]    ICU TEAM Progress Note    Name: Yao Knight   : 1950   MRN: [id number]   Date: 2021      I  Subjective:   Progress Note: 2021      Reason for ICU Admission: Coming from [place] [address], hypotension VAP, chronic respiratory failure, end-stage renal disease on hemodialysis    Interval history:  30[facility] on  due to hypotension on hemodialysis.  Patient admitted and was started on treatment for ventilator associated pneumonia as he is chronically on a ventilator in the long-term facility and x-ray was concerning for right lower lobe infiltrate.  He required to be on pressors but this is improving. Overnight Events:   No acute event overnight    Active Problem List:     Problem List  Never Reviewed          Codes Class    Hypotension ICD-10-CM: I95.9  ICD-9-CM: 458.9               Past Medical History:      has no past medical history on file. Past Surgical History:      has no past surgical history on file. Home Medications:     Prior to Admission medications    Medication Sig Start Date End Date Taking? Authorizing Provider   ammonium lactate (LAC-HYDRIN) 12 % lotion Apply  to affected area two (2) times a day. rub in to affected area well   Yes Provider, Historical   famotidine (PEPCID) 20 mg tablet Take 20 mg by mouth daily. Yes Provider, Historical   heparin sodium,porcine (heparin, porcine,) 5,000 unit/mL injection 5,000 Units by SubCUTAneous route every twelve (12) hours. Yes Provider, Historical   lactobacillus-acidophilus (LACTINEX) 100 million cell grpk Take 1 Packet by mouth two (2) times a day. (Floranex)   Yes Provider, Historical   insulin regular (NOVOLIN R, HUMULIN R) 100 unit/mL injection by SubCUTAneous route. Sliding scale at [employer], Historical   guaiFENesin (ROBITUSSIN) 100 mg/5 mL liquid Take 200 mg by mouth every six (6) hours.    Yes Provider, Historical   levothyroxine (Synthroid) 100 mcg tablet Take 100 mcg by mouth daily. Yes Provider, Historical   melatonin 3 mg tablet Take 3 mg by mouth nightly. Yes Provider, Historical   midodrine (PROAMATINE) 5 mg tablet Take 15 mg by mouth every eight (8) hours. Yes Provider, Historical   ondansetron (ZOFRAN) 2 mg/mL injection 4 mg by IntraVENous route every four (4) hours as needed for Nausea. Yes Provider, Historical   vancomycin HCl (VANCOMYCIN PO) Take 500 mg by mouth every six (6) hours. Indications: diarrhea from an infection with Clostridium difficile bacteria, C diff positive 20 at [facility] 20  Yes Provider, Historical   acetaminophen (TYLENOL) 650 mg/20.3 mL solution Take 650 mg by mouth every six (6) hours as needed for Fever (pain). Yes Provider, Historical   albuterol (PROVENTIL VENTOLIN) 2.5 mg /3 mL (0.083 %) nebu 2.5 mg by Nebulization route three (3) times daily as needed for Wheezing, Shortness of Breath or Respiratory Distress. Yes Provider, Historical   albuterol-ipratropium (DUO-NEB) 2.5 mg-0.5 mg/3 ml nebu 3 mL by Nebulization route three (3) times daily as needed for Wheezing, Shortness of Breath or Respiratory Distress. Yes Provider, Historical   chlorhexidine (PERIDEX) 0.12 % solution 15 mL by Swish and Spit route three (3) times daily as needed (chronic ventilation - mouth swab). Yes Provider, Historical       Allergies/Social/Family History:     No Known Allergies   Social History     Tobacco Use    Smoking status: Not on file   Substance Use Topics    Alcohol use: Not on file      No family history on file.     Review of Systems:     Not able to obtain giving the patient medical condition    Objective:   Vital Signs:  Visit Vitals  /70   Pulse 96   Temp 97.8 °F (36.6 °C)   Resp 18   Ht 6' (1.829 m)   Wt 76.4 kg (168 lb 6.9 oz)   SpO2 93%   BMI 22.84 kg/m²      O2 Device: Tracheostomy Temp (24hrs), Av.3 °F (36.8 °C), Min:97.8 °F (36.6 °C), Max:98.8 °F (37.1 °C) Intake/Output:     Intake/Output Summary (Last 24 hours) at 1/12/2021 1047  Last data filed at 1/12/2021 1000  Gross per 24 hour   Intake 1675 ml   Output    Net 1675 ml       Physical Exam:    General:  Trach on vent, comfortable    Eyes:  Sclera anicteric. Pupils equal, round, reactive to light. Mouth/Throat:  Clear, no bleeding   Neck:  Tracheostomy   Lungs:    Good air entry, coarse crepitation bilaterally   Cardiovascular:  Regular rate and rhythm, no murmur, click, rub, or gallop. Abdomen:   Soft, non-tender, bowel sounds normal, non-distended.  PEG tube   Extremities: No cyanosis or edema. Skin: No acute rash or lesions. Lymph Nodes: Cervical and supraclavicular normal.   Musculoskeletal:  No swelling or deformity. Lines/Devices:  Intact, no erythema, drainage, or tenderness. LABS AND  DATA: Personally reviewed  Recent Labs     01/12/21 0226 01/11/21  0438   WBC 14.3* 13.7*   HGB 7.8* 7.8*   HCT 24.0* 24.2*    322     Recent Labs     01/12/21 0226 01/11/21  0438    142   K 3.1* 3.2*    106   CO2 29 28   BUN 28* 22*   CREA 3.59* 3.01*   * 92   CA 9.6 9.2     No results for input(s): AP, TBIL, TP, ALB, GLOB, AML, LPSE in the last 72 hours. No lab exists for component: SGOT, GPT, AMYP  No results for input(s): INR, PTP, APTT, INREXT in the last 72 hours. No results for input(s): PHI, PCO2I, PO2I, FIO2I in the last 72 hours. No results for input(s): CPK, CKMB, TROIQ, BNPP in the last 72 hours.     Hemodynamics:   PAP:   CO:     Wedge:   CI:     CVP:    SVR:       PVR:       Ventilator Settings:  Mode Rate Tidal Volume Pressure FiO2 PEEP   Assist control, Volume control   500 ml  20 cm H2O 30 % 5 cm H20     Peak airway pressure: 32 cm H2O    Minute ventilation: 9.98 l/min        MEDS: Reviewed    Chest X-Ray:  CXR Results  (Last 48 hours)    None            Assessment and Plan:   Chronic hypoxic respiratory failure and on chronic mechanical ventilation via tracheostomy:  Currently on assist control mode, no recent increase in ventilatory requirement, had multiple left-sided rib fracture and I believe this is the main underlying etiology for his rapid shallow breathing. Possible ventilator associated pneumonia: Leukocytosis and infiltrate, on chronic ventilator from outside facility.    Culture negative, discontinue vancomycin January 10.  It is reasonable to continue on Zosyn for total of 7 days, last day will be January 12  End-stage renal disease: This is complicated with hypotension, been off Levophed since this morning, on midodrine 10 mg every 8.    Tolerated hemodialysis off pressors .  Appreciate nephrology     DVT and GI prophylaxis, ventilator bundle       DISPOSITION  Stay in ICU    CRITICAL CARE CONSULTANT NOTE  I had a face to face encounter with the patient, reviewed and interpreted patient data including clinical events, labs, images, vital signs, I/O's, and examined patient. I have discussed the case and the plan and management of the patient's care with the consulting services, the bedside nurses and the respiratory therapist.      NOTE OF PERSONAL INVOLVEMENT IN CARE   This patient has a high probability of imminent, clinically significant deterioration, which requires the highest level of preparedness to intervene urgently. I participated in the decision-making and personally managed or directed the management of the following life and organ supporting interventions that required my frequent assessment to treat or prevent imminent deterioration. I personally spent 40 minutes of critical care time. This is time spent at this critically ill patient's bedside actively involved in patient care as well as the coordination of care and discussions with the patient's family. This does not include any procedural time which has been billed separately. Judah Verdin M.D.   Staff Intensivist/Pulmonologist  [facility] Care  1/12/2021

## 2021-01-12 NOTE — PROCEDURES
[facility]  [phone]    Vitals   Pre   Post   Assessment   Pre   Post     Temp  98.4  98.1   LOC  A/O x 3 A/O x 3   HR   91 83 Lungs   Clear, denies SOB  Clear   B/P   107/66 96/69 Cardiac   irreg HR, monitored at [place] and remotely  Irreg HR   Resp   14 17 Skin   WDI  WDI   Pain level  0/10 0/10 Edema  gen puffiness, facial and extremity swelling (hands and feet) 1+     gen puffiness, hands and feet still 1+   Orders:    Duration:   Start:    4941 End:    1615 Total:   3.5 hrs   Dialyzer:   Dialyzer/Set Up Inspection: Sophie([id number]) (01/12/21 1240)   K Bath:   Dialysate K (mEq/L): 2 (01/12/21 1240)   Ca Bath:   Dialysate CA (mEq/L): 2.0 (01/12/21 1240)   Na/Bicarb:   Dialysate NA (mEq/L): 140 (01/12/21 1240)   Target Fluid Removal:   Goal/Amount of Fluid to Remove (mL): 1500 mL (01/12/21 1240)   Access     Type & Location:   RIJ cath:  Ports prepped w/ alcohol and prevantics. +aspir/ NS flushes. CHG Drsg CDI, dated 1/9/21. No redness or drainage noted.    Labs     Obtained/Reviewed   Critical Results Called   Date when labs were drawn-  Hgb-    HGB   Date Value Ref Range Status   01/12/2021 7.8 (L) 12.1 - 17.0 g/dL Final     K-    Potassium   Date Value Ref Range Status   01/12/2021 3.1 (L) 3.5 - 5.1 mmol/L Final     Ca-   Calcium   Date Value Ref Range Status   01/12/2021 9.6 8.5 - 10.1 MG/DL Final     Bun-   BUN   Date Value Ref Range Status   01/12/2021 28 (H) 6 - 20 MG/DL Final     Creat-   Creatinine   Date Value Ref Range Status   01/12/2021 3.59 (H) 0.70 - 1.30 MG/DL Final        Medications/ Blood Products Given     Name   Dose   Route and Time     Heparin 1000 units/ml A=2.0 ml  ICD    V=2.0ml ICD        Blood Volume Processed (BVP):    76L Net Fluid   Removed:  1000 ml   Comments   Time Out Done: @ 9618 MJ  Primary Nurse Rpt Pre:  Kinza Villatoro RN  Primary Nurse Rpt Post:  Kinza Villatoro RN  Pt Education: Procedural  Care Plan:  Continue to do HD txs as ordered  Tx Summary:  Tolerated tx fairly-some hypotension which was treated w/ midodrine and ALB 12.5gms IV  Admiting Diagnosis:  Hypotension  resp failure  ESRD  Consent signed - yes--obtained         Hepatitis Status- Hep B Ag neg 1/7/21                             Hep B AB susc 1/7/21  Machine #- Machine Number: [id number] (01/12/21 1240)  Telemetry status-monitored at [place] and remotely  Pre-dialysis wt. - Pre-Dialysis Weight: 79 kg (174 lb 2.6 oz) (01/12/21 1240)

## 2021-01-12 NOTE — PROGRESS NOTES
0730 Bedside and Verbal shift change report given to Nirmala RN (oncoming nurse) by Ayan Squires RN (offgoing nurse). Report included the following information SBAR, Kardex, Intake/Output, MAR, Recent Results and Cardiac Rhythm NSR w/ PVCs. 0930 Pt c/o nausea - TF held & prn zofran given  1000 Bedside IDRs - pt unable to go to [facility] today d/t lack of staffing at [facility]   1030 Restarted TF  1230 TF held per [facility] RN request  1630 HD complete - see [facility] note  725 [facility] complete - inner cannula changed - pt tolerated well  1930 Bedside and Verbal shift change report given to Ayan Squires RN (oncoming nurse) by Umair Aviles RN (offgoing nurse). Report included the following information SBAR, Kardex, Intake/Output, MAR, Accordion, Recent Results and Cardiac Rhythm NSR w/ PVCs.

## 2021-01-12 NOTE — INTERDISCIPLINARY ROUNDS
Multidisciplinary rounds were held 1/12/2021.   Today's plan/goal includes (but not limited to): stable VS, pain free, discharge to [facility]

## 2021-01-12 NOTE — PROGRESS NOTES
2000 Received report from [address] and assumed care. VSS, denies pain. Call bell at side. 2200 Complete chlorhexidine bed bath provided, tolerated well. 2255 Fentanyl given for left third toe pain, per pt request. Will continue to monitor. 0230 Labs drawn. 0400 Trach care completed and inner cannula changed. 0730 Bedside and Verbal shift change report given to Nirmala (oncoming nurse) by Cristofer Palacios (offgoing nurse). Report included the following information SBAR, Kardex, Intake/Output, MAR, Recent Results and Alarm Parameters .

## 2021-01-13 VITALS
SYSTOLIC BLOOD PRESSURE: 111 MMHG | HEIGHT: 72 IN | RESPIRATION RATE: 10 BRPM | OXYGEN SATURATION: 95 % | DIASTOLIC BLOOD PRESSURE: 73 MMHG | TEMPERATURE: 97.7 F | WEIGHT: 164.24 LBS | BODY MASS INDEX: 22.25 KG/M2 | HEART RATE: 80 BPM

## 2021-01-13 LAB
ALBUMIN SERPL-MCNC: 2.3 G/DL (ref 3.5–5)
ALBUMIN/GLOB SERPL: 0.5 {RATIO} (ref 1.1–2.2)
ALP SERPL-CCNC: 223 U/L (ref 45–117)
ALT SERPL-CCNC: 9 U/L (ref 12–78)
ANION GAP SERPL CALC-SCNC: 5 MMOL/L (ref 5–15)
AST SERPL-CCNC: 24 U/L (ref 15–37)
BILIRUB SERPL-MCNC: 0.4 MG/DL (ref 0.2–1)
BUN SERPL-MCNC: 17 MG/DL (ref 6–20)
BUN/CREAT SERPL: 7 (ref 12–20)
CALCIUM SERPL-MCNC: 9 MG/DL (ref 8.5–10.1)
CHLORIDE SERPL-SCNC: 100 MMOL/L (ref 97–108)
CO2 SERPL-SCNC: 31 MMOL/L (ref 21–32)
CREAT SERPL-MCNC: 2.47 MG/DL (ref 0.7–1.3)
ERYTHROCYTE [DISTWIDTH] IN BLOOD BY AUTOMATED COUNT: 17.4 % (ref 11.5–14.5)
GLOBULIN SER CALC-MCNC: 5.1 G/DL (ref 2–4)
GLUCOSE SERPL-MCNC: 100 MG/DL (ref 65–100)
HCT VFR BLD AUTO: 23.1 % (ref 36.6–50.3)
HGB BLD-MCNC: 7.5 G/DL (ref 12.1–17)
MCH RBC QN AUTO: 28.4 PG (ref 26–34)
MCHC RBC AUTO-ENTMCNC: 32.5 G/DL (ref 30–36.5)
MCV RBC AUTO: 87.5 FL (ref 80–99)
NRBC # BLD: 0.26 K/UL (ref 0–0.01)
NRBC BLD-RTO: 2.1 PER 100 WBC
PLATELET # BLD AUTO: 342 K/UL (ref 150–400)
PMV BLD AUTO: 11.1 FL (ref 8.9–12.9)
POTASSIUM SERPL-SCNC: 2.7 MMOL/L (ref 3.5–5.1)
PROT SERPL-MCNC: 7.4 G/DL (ref 6.4–8.2)
RBC # BLD AUTO: 2.64 M/UL (ref 4.1–5.7)
SODIUM SERPL-SCNC: 136 MMOL/L (ref 136–145)
WBC # BLD AUTO: 12.3 K/UL (ref 4.1–11.1)
ZINC SERPL-MCNC: 55 UG/DL (ref 44–115)

## 2021-01-13 PROCEDURE — 85027 COMPLETE CBC AUTOMATED: CPT

## 2021-01-13 PROCEDURE — 74011250636 HC RX REV CODE- 250/636: Performed by: INTERNAL MEDICINE

## 2021-01-13 PROCEDURE — 74011250636 HC RX REV CODE- 250/636

## 2021-01-13 PROCEDURE — 65620000000 HC RM CCU GENERAL

## 2021-01-13 PROCEDURE — 80053 COMPREHEN METABOLIC PANEL: CPT

## 2021-01-13 PROCEDURE — 74011250636 HC RX REV CODE- 250/636: Performed by: NURSE PRACTITIONER

## 2021-01-13 PROCEDURE — 94003 VENT MGMT INPAT SUBQ DAY: CPT

## 2021-01-13 PROCEDURE — 94640 AIRWAY INHALATION TREATMENT: CPT

## 2021-01-13 PROCEDURE — 74011000258 HC RX REV CODE- 258: Performed by: NURSE PRACTITIONER

## 2021-01-13 PROCEDURE — 74011250637 HC RX REV CODE- 250/637: Performed by: NURSE PRACTITIONER

## 2021-01-13 PROCEDURE — 74011000250 HC RX REV CODE- 250: Performed by: ANESTHESIOLOGY

## 2021-01-13 PROCEDURE — 36415 COLL VENOUS BLD VENIPUNCTURE: CPT

## 2021-01-13 PROCEDURE — 74011250637 HC RX REV CODE- 250/637: Performed by: INTERNAL MEDICINE

## 2021-01-13 PROCEDURE — 74011000258 HC RX REV CODE- 258: Performed by: ANESTHESIOLOGY

## 2021-01-13 PROCEDURE — 74011250636 HC RX REV CODE- 250/636: Performed by: ANESTHESIOLOGY

## 2021-01-13 RX ORDER — POTASSIUM CHLORIDE 29.8 MG/ML
20 INJECTION INTRAVENOUS
Status: COMPLETED | OUTPATIENT
Start: 2021-01-13 | End: 2021-01-13

## 2021-01-13 RX ORDER — DIPHENHYDRAMINE HYDROCHLORIDE 50 MG/ML
50 INJECTION, SOLUTION INTRAMUSCULAR; INTRAVENOUS ONCE
Status: COMPLETED | OUTPATIENT
Start: 2021-01-13 | End: 2021-01-13

## 2021-01-13 RX ORDER — POTASSIUM CHLORIDE 29.8 MG/ML
INJECTION INTRAVENOUS
Status: COMPLETED
Start: 2021-01-13 | End: 2021-01-13

## 2021-01-13 RX ADMIN — Medication 30 ML: at 21:09

## 2021-01-13 RX ADMIN — PIPERACILLIN AND TAZOBACTAM 3.38 G: 3; .375 INJECTION, POWDER, LYOPHILIZED, FOR SOLUTION INTRAVENOUS at 12:50

## 2021-01-13 RX ADMIN — Medication 3 MG: at 21:09

## 2021-01-13 RX ADMIN — CHLORHEXIDINE GLUCONATE 15 ML: 0.12 RINSE ORAL at 21:11

## 2021-01-13 RX ADMIN — IPRATROPIUM BROMIDE AND ALBUTEROL SULFATE 3 ML: .5; 3 SOLUTION RESPIRATORY (INHALATION) at 19:45

## 2021-01-13 RX ADMIN — IPRATROPIUM BROMIDE AND ALBUTEROL SULFATE 3 ML: .5; 3 SOLUTION RESPIRATORY (INHALATION) at 15:21

## 2021-01-13 RX ADMIN — FAMOTIDINE 20 MG: 20 TABLET, FILM COATED ORAL at 09:08

## 2021-01-13 RX ADMIN — POTASSIUM CHLORIDE 20 MEQ: 400 INJECTION, SOLUTION INTRAVENOUS at 06:15

## 2021-01-13 RX ADMIN — POTASSIUM CHLORIDE 20 MEQ: 400 INJECTION, SOLUTION INTRAVENOUS at 07:15

## 2021-01-13 RX ADMIN — POTASSIUM CHLORIDE 20 MEQ: 400 INJECTION, SOLUTION INTRAVENOUS at 11:28

## 2021-01-13 RX ADMIN — Medication: at 09:08

## 2021-01-13 RX ADMIN — PIPERACILLIN AND TAZOBACTAM 3.38 G: 3; .375 INJECTION, POWDER, LYOPHILIZED, FOR SOLUTION INTRAVENOUS at 00:42

## 2021-01-13 RX ADMIN — IPRATROPIUM BROMIDE AND ALBUTEROL SULFATE 3 ML: .5; 3 SOLUTION RESPIRATORY (INHALATION) at 01:01

## 2021-01-13 RX ADMIN — Medication 10 ML: at 14:30

## 2021-01-13 RX ADMIN — POTASSIUM CHLORIDE 20 MEQ: 400 INJECTION, SOLUTION INTRAVENOUS at 12:50

## 2021-01-13 RX ADMIN — IPRATROPIUM BROMIDE AND ALBUTEROL SULFATE 3 ML: .5; 3 SOLUTION RESPIRATORY (INHALATION) at 07:32

## 2021-01-13 RX ADMIN — Medication: at 18:41

## 2021-01-13 RX ADMIN — POTASSIUM CHLORIDE 20 MEQ: 400 INJECTION, SOLUTION INTRAVENOUS at 10:21

## 2021-01-13 RX ADMIN — POTASSIUM CHLORIDE 20 MEQ: 400 INJECTION, SOLUTION INTRAVENOUS at 09:00

## 2021-01-13 RX ADMIN — DIPHENHYDRAMINE HYDROCHLORIDE 50 MG: 50 INJECTION, SOLUTION INTRAMUSCULAR; INTRAVENOUS at 00:25

## 2021-01-13 RX ADMIN — MIDODRINE HYDROCHLORIDE 10 MG: 5 TABLET ORAL at 21:09

## 2021-01-13 RX ADMIN — POLYVINYL ALCOHOL, POVIDONE 1 DROP: .5; .6 LIQUID OPHTHALMIC at 09:08

## 2021-01-13 RX ADMIN — MIDODRINE HYDROCHLORIDE 10 MG: 5 TABLET ORAL at 13:03

## 2021-01-13 RX ADMIN — CHLORHEXIDINE GLUCONATE 15 ML: 0.12 RINSE ORAL at 09:08

## 2021-01-13 RX ADMIN — FENTANYL CITRATE 50 MCG: 50 INJECTION, SOLUTION INTRAMUSCULAR; INTRAVENOUS at 23:11

## 2021-01-13 RX ADMIN — Medication 10 ML: at 05:34

## 2021-01-13 RX ADMIN — FENTANYL CITRATE 50 MCG: 50 INJECTION, SOLUTION INTRAMUSCULAR; INTRAVENOUS at 03:37

## 2021-01-13 RX ADMIN — HEPARIN SODIUM 5000 UNITS: 5000 INJECTION INTRAVENOUS; SUBCUTANEOUS at 17:00

## 2021-01-13 RX ADMIN — MIDODRINE HYDROCHLORIDE 10 MG: 5 TABLET ORAL at 05:59

## 2021-01-13 RX ADMIN — HEPARIN SODIUM 5000 UNITS: 5000 INJECTION INTRAVENOUS; SUBCUTANEOUS at 03:37

## 2021-01-13 RX ADMIN — POLYVINYL ALCOHOL, POVIDONE 1 DROP: .5; .6 LIQUID OPHTHALMIC at 21:09

## 2021-01-13 NOTE — PROGRESS NOTES
[facility]    ICU TEAM Progress Note    Name: Geo Moeller   : 1950   MRN: [id number]   Date: 2021      Subjective:   Progress Note: 2021      Reason for ICU Admission: Coming from [place] [address], hypotension VAP, chronic respiratory failure, end-stage renal disease on hemodialysis    Interval history:  23[facility] on  due to hypotension on hemodialysis.  Patient admitted and was started on treatment for ventilator associated pneumonia as he is chronically on a ventilator in the long-term facility and x-ray was concerning for right lower lobe infiltrate.  He required to be on pressors but this is improving. Overnight Events:   No acute event overnight    Active Problem List:     Problem List  Never Reviewed          Codes Class    Hypotension ICD-10-CM: I95.9  ICD-9-CM: 458.9               Past Medical History:      has no past medical history on file. Past Surgical History:      has no past surgical history on file. Home Medications:     Prior to Admission medications    Medication Sig Start Date End Date Taking? Authorizing Provider   ammonium lactate (LAC-HYDRIN) 12 % lotion Apply  to affected area two (2) times a day. rub in to affected area well   Yes Provider, Historical   famotidine (PEPCID) 20 mg tablet Take 20 mg by mouth daily. Yes Provider, Historical   heparin sodium,porcine (heparin, porcine,) 5,000 unit/mL injection 5,000 Units by SubCUTAneous route every twelve (12) hours. Yes Provider, Historical   lactobacillus-acidophilus (LACTINEX) 100 million cell grpk Take 1 Packet by mouth two (2) times a day. (Floranex)   Yes Provider, Historical   insulin regular (NOVOLIN R, HUMULIN R) 100 unit/mL injection by SubCUTAneous route. Sliding scale at [employer], Historical   guaiFENesin (ROBITUSSIN) 100 mg/5 mL liquid Take 200 mg by mouth every six (6) hours.    Yes Provider, Historical   levothyroxine (Synthroid) 100 mcg tablet Take 100 mcg by mouth daily. Yes Provider, Historical   melatonin 3 mg tablet Take 3 mg by mouth nightly. Yes Provider, Historical   midodrine (PROAMATINE) 5 mg tablet Take 15 mg by mouth every eight (8) hours. Yes Provider, Historical   ondansetron (ZOFRAN) 2 mg/mL injection 4 mg by IntraVENous route every four (4) hours as needed for Nausea. Yes Provider, Historical   vancomycin HCl (VANCOMYCIN PO) Take 500 mg by mouth every six (6) hours. Indications: diarrhea from an infection with Clostridium difficile bacteria, C diff positive 20 at [facility] 20  Yes Provider, Historical   acetaminophen (TYLENOL) 650 mg/20.3 mL solution Take 650 mg by mouth every six (6) hours as needed for Fever (pain). Yes Provider, Historical   albuterol (PROVENTIL VENTOLIN) 2.5 mg /3 mL (0.083 %) nebu 2.5 mg by Nebulization route three (3) times daily as needed for Wheezing, Shortness of Breath or Respiratory Distress. Yes Provider, Historical   albuterol-ipratropium (DUO-NEB) 2.5 mg-0.5 mg/3 ml nebu 3 mL by Nebulization route three (3) times daily as needed for Wheezing, Shortness of Breath or Respiratory Distress. Yes Provider, Historical   chlorhexidine (PERIDEX) 0.12 % solution 15 mL by Swish and Spit route three (3) times daily as needed (chronic ventilation - mouth swab). Yes Provider, Historical       Allergies/Social/Family History:     No Known Allergies   Social History     Tobacco Use    Smoking status: Not on file   Substance Use Topics    Alcohol use: Not on file      No family history on file.     Review of Systems:     Not able to obtain giving the patient medical condition    Objective:   Vital Signs:  Visit Vitals  BP 98/74   Pulse 87   Temp 98.9 °F (37.2 °C)   Resp 17   Ht 6' (1.829 m)   Wt 74.5 kg (164 lb 3.9 oz)   SpO2 96%   BMI 22.28 kg/m²      O2 Device: Tracheostomy, Ventilator Temp (24hrs), Av.7 °F (37.1 °C), Min:98.4 °F (36.9 °C), Max:99.1 °F (37.3 °C) Intake/Output:     Intake/Output Summary (Last 24 hours) at 1/13/2021 1209  Last data filed at 1/13/2021 0715  Gross per 24 hour   Intake 1545 ml   Output 1000 ml   Net 545 ml       Physical Exam:    General:  Trach on vent, comfortable    Eyes:  Sclera anicteric. Pupils equal, round, reactive to light. Mouth/Throat:  Clear, no bleeding   Neck:  Tracheostomy   Lungs:    Good air entry, coarse crepitation bilaterally   Cardiovascular:  Regular rate and rhythm, no murmur, click, rub, or gallop. Abdomen:   Soft, non-tender, bowel sounds normal, non-distended.  PEG tube   Extremities: No cyanosis or edema. Skin: No acute rash or lesions. Lymph Nodes: Cervical and supraclavicular normal.   Musculoskeletal:  No swelling or deformity. Lines/Devices:  Intact, no erythema, drainage, or tenderness. LABS AND  DATA: Personally reviewed  Recent Labs     01/13/21 0347 01/12/21 0226   WBC 12.3* 14.3*   HGB 7.5* 7.8*   HCT 23.1* 24.0*    361     Recent Labs     01/13/21 0347 01/12/21 0226    140   K 2.7* 3.1*    105   CO2 31 29   BUN 17 28*   CREA 2.47* 3.59*    103*   CA 9.0 9.6     Recent Labs     01/13/21 0347   *   TP 7.4   ALB 2.3*   GLOB 5.1*     No results for input(s): INR, PTP, APTT, INREXT, INREXT in the last 72 hours. No results for input(s): PHI, PCO2I, PO2I, FIO2I in the last 72 hours. No results for input(s): CPK, CKMB, TROIQ, BNPP in the last 72 hours.     Hemodynamics:   PAP:   CO:     Wedge:   CI:     CVP:    SVR:       PVR:       Ventilator Settings:  Mode Rate Tidal Volume Pressure FiO2 PEEP   Assist control   500 ml  20 cm H2O 30 % 5 cm H20     Peak airway pressure: 32 cm H2O    Minute ventilation: 10.9 l/min        MEDS: Reviewed    Assessment and Plan:     --Chronic Hypoxic Respiratory Failure   --Chronic Mechanical Ventilation via Tracheostomy  --Possible ventilator associated pneumonia (PTA)  Leukocytosis and infiltrates -- improved  On chronic ventilator from outside facility ([facility])   Culture negative  Zosyn for total of 7 days (last dose today)  SBT this AM    --End-stage renal disease  --Hypotension  Midodrine 10 mg every 8  Tolerated hemodialysis off pressors   Appreciate nephrology input     DVT and GI prophylaxis, ventilator bundle     DISPOSITION  Stay in ICU    CRITICAL CARE CONSULTANT NOTE  I had a face to face encounter with the patient, reviewed and interpreted patient data including clinical events, labs, images, vital signs, I/O's, and examined patient. I have discussed the case and the plan and management of the patient's care with the consulting services, the bedside nurses and the respiratory therapist.      NOTE OF PERSONAL INVOLVEMENT IN CARE   This patient has a high probability of imminent, clinically significant deterioration, which requires the highest level of preparedness to intervene urgently. I participated in the decision-making and personally managed or directed the management of the following life and organ supporting interventions that required my frequent assessment to treat or prevent imminent deterioration. I personally spent 75 minutes of critical care time. This is time spent at this critically ill patient's bedside actively involved in patient care as well as the coordination of care and discussions with the patient's family. This does not include any procedural time which has been billed separately.     Elly Sanchez,   Staff Intensivist/Anesthesiologist   [facility] Critical Care  1/13/2021

## 2021-01-13 NOTE — PROGRESS NOTES
2330: Report received from Dixon Coats RN. Pt in bed, resting quietly, but arouses spontaneously. Pt trach'd and on vent settings: A/C 12, V/C 500, PEEP 5, and FiO2 30%. Pt mouths words and writes. 0010: Pt itching all over, Dr. Claude Lobo updated - order received for one time benadryl dose. 0025: Benadryl given. Lotion applied    0030: Pt refusing trach care at this time. 4530: PRN fentanyl for BLE foot pain. 4501Colin Peel care completed. 8469: Pt incontinent stool. 2038: Critical K 2.7 - Dr. Claude Lobo updated, order received for 6 runs of KCl replete. 0745: Bedside and Verbal shift change report given to Shayna eNlson RN. Report included the following information SBAR, Intake/Output, MAR, Recent Results, Cardiac Rhythm NSR and Alarm Parameters .

## 2021-01-13 NOTE — PROGRESS NOTES
Name: Isabell Gregorio MRN: [id number]   : 1950 Hospital: [address]   Date: 2021        IMPRESSION:   · ESRD on HD. Patient was transferred from [facility] for management of accumulated edema and hypervolemia. Patient was not able to have adequate UF for the last 2 weeks. Patient had a successful HD/UF treatment yesterday with removal of 1.0 kg using variable Na Rx and albumin infusions. Patient is on TTS schedule. He had his last HD yesterday. · Chronic hypervolemia with peripheral edema and pulmonary edema- improved after HD. CXR- improved aeration. · Chronic respiratory failure- on vent. Weaning attempts are in progress. · Hypotension, chronic. On midodrine. 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 The UF goal will be increased to 1.5 kg if able to tolerate. ·  Will use again linear Na modeling, albumin and low dialysate T. Midodrine before HD  · If patient is able to tolerate removal of another 2.1-5 kg without complications he may be stable to be sent back to [facility]. · The rest of management- as per primary team  · Start venofer and resume Epogen when the iron sats are above 20%  · Patient is at his baseline. He is safe to be sent back to [facility]. Subjective/Interval History:   I have reviewed the flowsheet and previous days notes. ROS:Review of systems not obtained due to patient factors.     Objective:   Vital Signs:    Visit Vitals  BP 97/68 (BP 1 Location: Left arm, BP Patient Position: At rest)   Pulse 86   Temp 98.8 °F (37.1 °C)   Resp 18   Ht 6' (1.829 m)   Wt 74.5 kg (164 lb 3.9 oz)   SpO2 98%   BMI 22.28 kg/m²       O2 Device: Tracheostomy, Ventilator       Temp (24hrs), Av.7 °F (37.1 °C), Min:98.4 °F (36.9 °C), Max:99.1 °F (37.3 °C)       Intake/Output:   Last shift:       07 -  1900  In: 50 [I.V.:50]  Out: -   Last 3 shifts:  1901 -  0700  In: 1700 [I.V.:530]  Out: 1000     Intake/Output Summary (Last 24 hours) at 1/13/2021 1320  Last data filed at 1/13/2021 0715  Gross per 24 hour   Intake 1490 ml   Output 1000 ml   Net 490 ml        Physical Exam:  General:    Alert, cooperative, no distress, appears stated age. On vent. Head:   Normocephalic, without obvious abnormality, atraumatic. Eyes:   Conjunctivae/corneas clear. Corrective glasses are on  Nose:  Nares normal. No drainage or sinus tenderness. Throat:    Lips, mucosa, and tongue normal.    Neck:  Trach in. + secretions. Lungs:   Decreased to auscultation bilaterally. Few Rhonchi. No rales. Chest wall:  No tenderness or deformity. No Accessory muscle use. Heart:   Regular rate and rhythm,  no murmur, rub or gallop. Abdomen:   Soft, non-tender. Not distended. Bowel sounds normal. No masses  Extremities: Extremities normal, atraumatic, No cyanosis. No edema. No clubbing  Skin:     Texture, turgor normal. No rashes or lesions. Not Jaundiced  Psych:  calm. Not depressed. Not anxious or agitated. DATA:  Labs:  Recent Labs     01/13/21 0347 01/12/21 0226 01/11/21 0438    140 142   K 2.7* 3.1* 3.2*    105 106   CO2 31 29 28   BUN 17 28* 22*   CREA 2.47* 3.59* 3.01*   CA 9.0 9.6 9.2   ALB 2.3*  --   --      Recent Labs     01/13/21 0347 01/12/21 0226 01/11/21  0438   WBC 12.3* 14.3* 13.7*   HGB 7.5* 7.8* 7.8*   HCT 23.1* 24.0* 24.2*    361 322     No results for input(s): TETE, KU, CLU, CREAU in the last 72 hours.     No lab exists for component: PROU    Total time spent with patient:  35 minutes    [] Critical Care Provided    Care Plan discussed with:   Staff, Medical Team    Tania Nicholson MD

## 2021-01-13 NOTE — DISCHARGE SUMMARY
[facility]                                                                                         Discharge Summary     Patient: Cherise Meeks       MRN: [id number]       YOB: 1950       Age: 72 y.o. Date of admission:  1/6/2021    Date of discharge:  1/13/2021    Primary care provider:  UNKNOWN     Admitting provider:  Russ Torres MD    Discharging provider(s): Ascencion Storey DO - Staff [address]     Consultations  · IP CONSULT TO NEPHROLOGY    Procedures  · Dialysis    Discharge destination: [address] [place] [address]. The patient is stable for discharge. Admission diagnosis  · Hypotension [I95.9]    Please refer to the admission history and physical for details on the presenting problem. Final discharge diagnoses and brief hospital course    Hypotension secondary to dialysis (ESRD) & concomitant Ventilator-Associated Pneumonia    A pleasant 71-year-old gentleman coming from a long-term acute care facility [facility] on January 7, 2021 due to hypotension while receiving hemodialysis. [facility] is chronically on a ventilator at the long-term facility and his Chest X-ray was concerning for a right lower lobe infiltrate upon admission. Patient was subsequently admitted to this facility and was started on treatment for ventilator associated pneumonia (Vancomycin and Zosyn). Vancomycin was stopped after 3 days, and he completed a 7 day course of Zosyn. He required Norepinephrine intermittently during his hospital stay, but is now stable on Midodrine 10 mg q 8 hours. Continue all regularly scheduled medications along with taking Midodrine q 8 hours.        Physical examination at discharge  Visit Vitals  BP 97/66   Pulse 84   Temp 98.8 °F (37.1 °C)   Resp (!) 0   Ht 6' (1.829 m)   Wt 74.5 kg (164 lb 3.9 oz)   SpO2 95%   BMI 22.28 kg/m²          Recent Labs     01/13/21  0347 01/12/21  0226 01/11/21  0438   WBC 12.3* 14.3* 13.7*   HGB 7.5* 7.8* 7.8*   HCT 23.1* 24.0* 24.2*    361 322     Recent Labs     01/13/21  0347 01/12/21  0226 01/11/21  0438    140 142   K 2.7* 3.1* 3.2*    105 106   CO2 31 29 28   BUN 17 28* 22*   CREA 2.47* 3.59* 3.01*    103* 92   CA 9.0 9.6 9.2     Recent Labs     01/13/21  0347   *   TP 7.4   ALB 2.3*   GLOB 5.1*     No results for input(s): INR, PTP, APTT, INREXT in the last 72 hours. No results for input(s): FE, TIBC, PSAT, FERR in the last 72 hours. No results for input(s): PH, PCO2, PO2 in the last 72 hours. No results for input(s): CPK, CKMB in the last 72 hours. No lab exists for component: TROPONINI  No components found for: [place]    Pertinent imaging studies:  ---------------------------------    Chronic Diagnoses:    Problem List as of 1/13/2021 Never Reviewed          Codes Class Noted - Resolved    Hypotension ICD-10-CM: I95.9  ICD-9-CM: 458.9  1/7/2021 - Present              Time spent on discharge related activities today greater than 30 minutes.       Signed:      Nicolas Álavrez DO   Staff Intensivist  [facility]    1/13/2021   3:22 PM   Attending        Cc: Andreas Christian

## 2021-01-13 NOTE — PROGRESS NOTES
0730: Bedside shift change report given to Romie Mac RN (oncoming nurse) by Laurence Gilford, RN (offgoing nurse). Report included the following information SBAR, Kardex, ED Summary, OR Summary, Procedure Summary, Intake/Output, MAR, Recent Results, Cardiac Rhythm NSR and Dual Neuro Assessment. 1930: TRANSFER - OUT REPORT:    Verbal report given to Susan Galarza RN(name) on Marcie Persons  being transferred to Romie Mac RN(unit) for routine progression of care       Report consisted of patients Situation, Background, Assessment and   Recommendations(SBAR). Information from the following report(s) SBAR, Kardex, ED Summary, OR Summary, Procedure Summary, Intake/Output, MAR, Recent Results and Cardiac Rhythm NSR c PVCs was reviewed with the receiving nurse. Lines:   PICC Double Lumen 01/07/21 Left; Other(comment) (Active)   Central Line Being Utilized Yes 01/12/21 2332   Criteria for Appropriate Use Limited/no vessel suitable for conventional peripheral access 01/12/21 2332   Site Assessment Clean 01/12/21 2000   Phlebitis Assessment 0 01/12/21 2332   Infiltration Assessment 0 01/12/21 2332   Date of Last Dressing Change 01/07/21 01/12/21 2332   Dressing Status Clean, dry, & intact 01/12/21 2332   Action Taken Open ports on tubing capped 01/12/21 2332   Dressing Type Disk with Chlorhexadine gluconate (CHG); Transparent 01/12/21 2332   Hub Color/Line Status Red;Patent; Flushed;Capped 01/12/21 2332   Positive Blood Return (Site #1) Yes 01/12/21 2332   Hub Color/Line Status Purple;Patent; Infusing 01/12/21 2332   Positive Blood Return (Site #2) Yes 01/12/21 2000   Alcohol Cap Used Yes 01/12/21 2332        Opportunity for questions and clarification was provided.       Patient transported with:   Monitor  O2 @ Vent liters  Patient's medications from home  Patient-specific medications from Pharmacy

## 2021-01-13 NOTE — PROGRESS NOTES
Transitions of Care Plan:   RUR: 12%   Transfer to [facility] today - bed available this PM; HD TTS; Trach w vent    Baseline - from [facility]; trach w vent; HD TTS   Disposition - Return to [facility]    1500 - CM received call from [facility] - bed available today if patient medically stable for discharge. CM to check with CCU RN re transfer to [facility]. 1505 - CM spoke with CCU RN - stable for transfer. HD is TTS. Intensivist notified. CM requested ALS transportation via [facility] - 1900 requested  time. 1515 - CM called [facility] to confirm  time requested and patient is scheduled (off will call). [facility] coordinator to update CM with same. 1522 - CM received confirmation of 2000  time for patient from [facility] coordinator. EMTALA:  Accepting Facility:  [facility]  Accepting MD: Kami  Transport: [facility] - [phone]  Transport Arrival: 8PM  RN Report:  [phone]    Transportation packet on hard chart. CCU RN notified of transport. Medicare pt has received, reviewed, and signed 2nd IM letter informing them of their right to appeal the discharge. Signed copied has been placed on pt bedside chart. - Verbal to patient's spouse. CM will continue to follow.     Vandana Gordillo, MPH

## 2021-01-13 NOTE — INTERDISCIPLINARY ROUNDS
Multidisciplinary rounds were held 1/13/2021.   Today's plan/goal includes (but not limited to): stable VS, pain free, discharge to [facility]

## 2021-01-13 NOTE — PROGRESS NOTES
2000 Received update from [facility] care. VSS, denies pain. Wife at bedside. 2230 Complete chlorhexidine bed bath provided, tolerated well. 2330 Bedside and Verbal shift change report given to Jodi Mcgrath (oncoming nurse) by Dominguez Puentes (offgoing nurse). Report included the following information SBAR, Kardex, Intake/Output, MAR, Recent Results and Alarm Parameters .

## 2021-01-14 NOTE — PROGRESS NOTES
2000 Received report from [facility] and assumed care. VSS, denies pain. Call bell at side. 2030 [facility] called and new  time 2315, wife and pt notified. 2345 [facility] arrived and patient transferred out on stretcher to [address] [place] [address].

## 2022-03-18 PROBLEM — I95.9 HYPOTENSION: Status: ACTIVE | Noted: 2021-01-07

## 2023-05-23 RX ORDER — IPRATROPIUM BROMIDE AND ALBUTEROL SULFATE 2.5; .5 MG/3ML; MG/3ML
3 SOLUTION RESPIRATORY (INHALATION) 3 TIMES DAILY PRN
COMMUNITY

## 2023-05-23 RX ORDER — MIDODRINE HYDROCHLORIDE 5 MG/1
15 TABLET ORAL EVERY 8 HOURS
COMMUNITY

## 2023-05-23 RX ORDER — FAMOTIDINE 20 MG/1
20 TABLET, FILM COATED ORAL DAILY
COMMUNITY

## 2023-05-23 RX ORDER — ALBUTEROL SULFATE 2.5 MG/3ML
2.5 SOLUTION RESPIRATORY (INHALATION) 3 TIMES DAILY PRN
COMMUNITY

## 2023-05-23 RX ORDER — HEPARIN SODIUM 5000 [USP'U]/ML
5000 INJECTION, SOLUTION INTRAVENOUS; SUBCUTANEOUS EVERY 12 HOURS
COMMUNITY

## 2023-05-23 RX ORDER — GUAIFENESIN 200 MG/10ML
200 LIQUID ORAL EVERY 6 HOURS
COMMUNITY

## 2023-05-23 RX ORDER — CHLORHEXIDINE GLUCONATE 0.12 MG/ML
15 RINSE ORAL 3 TIMES DAILY PRN
COMMUNITY

## 2023-05-23 RX ORDER — ACETAMINOPHEN 160 MG/5ML
650 SOLUTION ORAL EVERY 6 HOURS PRN
COMMUNITY

## 2023-05-23 RX ORDER — AMMONIUM LACTATE 12 G/100G
LOTION TOPICAL 2 TIMES DAILY
COMMUNITY

## 2023-05-23 RX ORDER — LEVOTHYROXINE SODIUM 0.1 MG/1
100 TABLET ORAL DAILY
COMMUNITY

## 2023-05-23 RX ORDER — ONDANSETRON 2 MG/ML
4 INJECTION INTRAMUSCULAR; INTRAVENOUS EVERY 4 HOURS PRN
COMMUNITY

## 2023-05-23 RX ORDER — LANOLIN ALCOHOL/MO/W.PET/CERES
3 CREAM (GRAM) TOPICAL NIGHTLY
COMMUNITY

## 2024-11-12 NOTE — PROGRESS NOTES
Occupational Therapy  1/12/2021    Chart reviewed. Attempted to see pt for OT. Pt currently receiving bedside dialysis. Will follow.  Sunny Mo, OT yes